# Patient Record
Sex: FEMALE | Race: WHITE | NOT HISPANIC OR LATINO | Employment: FULL TIME | URBAN - METROPOLITAN AREA
[De-identification: names, ages, dates, MRNs, and addresses within clinical notes are randomized per-mention and may not be internally consistent; named-entity substitution may affect disease eponyms.]

---

## 2018-05-01 ENCOUNTER — HISTORICAL (OUTPATIENT)
Dept: LAB | Facility: HOSPITAL | Age: 48
End: 2018-05-01

## 2018-08-15 ENCOUNTER — HISTORICAL (OUTPATIENT)
Dept: LAB | Facility: HOSPITAL | Age: 48
End: 2018-08-15

## 2019-03-23 ENCOUNTER — HISTORICAL (OUTPATIENT)
Dept: LAB | Facility: HOSPITAL | Age: 49
End: 2019-03-23

## 2019-03-23 LAB
ABS NEUT (OLG): 1.93 X10(3)/MCL (ref 2.1–9.2)
BASOPHILS # BLD AUTO: 0 X10(3)/MCL (ref 0–0.2)
BASOPHILS NFR BLD AUTO: 0 %
EOSINOPHIL # BLD AUTO: 0.1 X10(3)/MCL (ref 0–0.9)
EOSINOPHIL NFR BLD AUTO: 3 %
ERYTHROCYTE [DISTWIDTH] IN BLOOD BY AUTOMATED COUNT: 14.2 % (ref 11.5–17)
HCT VFR BLD AUTO: 42.2 % (ref 37–47)
HGB BLD-MCNC: 13.9 GM/DL (ref 12–16)
IRON SATN MFR SERPL: 17.2 % (ref 20–50)
IRON SERPL-MCNC: 64 MCG/DL (ref 50–175)
LYMPHOCYTES # BLD AUTO: 1.5 X10(3)/MCL (ref 0.6–4.6)
LYMPHOCYTES NFR BLD AUTO: 40 %
MCH RBC QN AUTO: 27.2 PG (ref 27–31)
MCHC RBC AUTO-ENTMCNC: 32.9 GM/DL (ref 33–36)
MCV RBC AUTO: 82.6 FL (ref 80–94)
MONOCYTES # BLD AUTO: 0.2 X10(3)/MCL (ref 0.1–1.3)
MONOCYTES NFR BLD AUTO: 6 %
NEUTROPHILS # BLD AUTO: 1.93 X10(3)/MCL (ref 1.4–7.9)
NEUTROPHILS NFR BLD AUTO: 52 %
PLATELET # BLD AUTO: 251 X10(3)/MCL (ref 130–400)
PMV BLD AUTO: 9.7 FL (ref 9.4–12.4)
RBC # BLD AUTO: 5.11 X10(6)/MCL (ref 4.2–5.4)
TIBC SERPL-MCNC: 372 MCG/DL (ref 250–450)
TRANSFERRIN SERPL-MCNC: 235 MG/DL (ref 200–360)
WBC # SPEC AUTO: 3.8 X10(3)/MCL (ref 4.5–11.5)

## 2019-06-22 ENCOUNTER — HISTORICAL (OUTPATIENT)
Dept: LAB | Facility: HOSPITAL | Age: 49
End: 2019-06-22

## 2020-08-29 ENCOUNTER — HISTORICAL (OUTPATIENT)
Dept: LAB | Facility: HOSPITAL | Age: 50
End: 2020-08-29

## 2021-12-11 ENCOUNTER — HISTORICAL (OUTPATIENT)
Dept: LAB | Facility: HOSPITAL | Age: 51
End: 2021-12-11

## 2022-10-04 DIAGNOSIS — D47.2 GAMMOPATHY, MONOCLONAL: Primary | ICD-10-CM

## 2022-10-10 PROBLEM — D72.819 LEUKOPENIA: Status: ACTIVE | Noted: 2022-10-10

## 2022-10-10 PROBLEM — D47.2 MONOCLONAL GAMMOPATHY: Status: ACTIVE | Noted: 2022-10-10

## 2022-10-12 ENCOUNTER — OFFICE VISIT (OUTPATIENT)
Dept: HEMATOLOGY/ONCOLOGY | Facility: CLINIC | Age: 52
End: 2022-10-12
Payer: COMMERCIAL

## 2022-10-12 VITALS
TEMPERATURE: 98 F | WEIGHT: 133 LBS | DIASTOLIC BLOOD PRESSURE: 90 MMHG | HEART RATE: 82 BPM | BODY MASS INDEX: 25.11 KG/M2 | HEIGHT: 61 IN | OXYGEN SATURATION: 100 % | SYSTOLIC BLOOD PRESSURE: 147 MMHG

## 2022-10-12 DIAGNOSIS — D47.2 GAMMOPATHY, MONOCLONAL: Primary | ICD-10-CM

## 2022-10-12 DIAGNOSIS — R76.8 ELEVATED RHEUMATOID FACTOR: ICD-10-CM

## 2022-10-12 DIAGNOSIS — D72.819 LEUKOPENIA, UNSPECIFIED TYPE: ICD-10-CM

## 2022-10-12 LAB
ALBUMIN SERPL-MCNC: 4.3 GM/DL (ref 3.5–5)
ALBUMIN/GLOB SERPL: 1.3 RATIO (ref 1.1–2)
ALP SERPL-CCNC: 80 UNIT/L (ref 40–150)
ALT SERPL-CCNC: 14 UNIT/L (ref 0–55)
AST SERPL-CCNC: 19 UNIT/L (ref 5–34)
BASOPHILS # BLD AUTO: 0.02 X10(3)/MCL (ref 0–0.2)
BASOPHILS NFR BLD AUTO: 0.4 %
BILIRUBIN DIRECT+TOT PNL SERPL-MCNC: 0.4 MG/DL
BUN SERPL-MCNC: 11.6 MG/DL (ref 9.8–20.1)
CALCIUM SERPL-MCNC: 9.9 MG/DL (ref 8.4–10.2)
CHLORIDE SERPL-SCNC: 102 MMOL/L (ref 98–107)
CO2 SERPL-SCNC: 32 MMOL/L (ref 22–29)
CREAT SERPL-MCNC: 0.83 MG/DL (ref 0.55–1.02)
EOSINOPHIL # BLD AUTO: 0.25 X10(3)/MCL (ref 0–0.9)
EOSINOPHIL NFR BLD AUTO: 5.1 %
ERYTHROCYTE [DISTWIDTH] IN BLOOD BY AUTOMATED COUNT: 12.2 % (ref 11.5–17)
GFR SERPLBLD CREATININE-BSD FMLA CKD-EPI: >60 MLS/MIN/1.73/M2
GLOBULIN SER-MCNC: 3.2 GM/DL (ref 2.4–3.5)
GLUCOSE SERPL-MCNC: 97 MG/DL (ref 74–100)
HCT VFR BLD AUTO: 43.6 % (ref 37–47)
HGB BLD-MCNC: 13.4 GM/DL (ref 12–16)
IMM GRANULOCYTES # BLD AUTO: 0 X10(3)/MCL (ref 0–0.04)
IMM GRANULOCYTES NFR BLD AUTO: 0 %
LYMPHOCYTES # BLD AUTO: 1.49 X10(3)/MCL (ref 0.6–4.6)
LYMPHOCYTES NFR BLD AUTO: 30.1 %
MCH RBC QN AUTO: 27.7 PG (ref 27–31)
MCHC RBC AUTO-ENTMCNC: 30.7 MG/DL (ref 33–36)
MCV RBC AUTO: 90.1 FL (ref 80–94)
MONOCYTES # BLD AUTO: 0.3 X10(3)/MCL (ref 0.1–1.3)
MONOCYTES NFR BLD AUTO: 6.1 %
NEUTROPHILS # BLD AUTO: 2.9 X10(3)/MCL (ref 2.1–9.2)
NEUTROPHILS NFR BLD AUTO: 58.3 %
PLATELET # BLD AUTO: 257 X10(3)/MCL (ref 130–400)
PMV BLD AUTO: 8.8 FL (ref 7.4–10.4)
POTASSIUM SERPL-SCNC: 4.2 MMOL/L (ref 3.5–5.1)
PROT SERPL-MCNC: 7.5 GM/DL (ref 6.4–8.3)
RBC # BLD AUTO: 4.84 X10(6)/MCL (ref 4.2–5.4)
SODIUM SERPL-SCNC: 140 MMOL/L (ref 136–145)
WBC # SPEC AUTO: 5 X10(3)/MCL (ref 4.5–11.5)

## 2022-10-12 PROCEDURE — 1160F PR REVIEW ALL MEDS BY PRESCRIBER/CLIN PHARMACIST DOCUMENTED: ICD-10-PCS | Mod: CPTII,S$GLB,, | Performed by: INTERNAL MEDICINE

## 2022-10-12 PROCEDURE — 82784 ASSAY IGA/IGD/IGG/IGM EACH: CPT | Performed by: INTERNAL MEDICINE

## 2022-10-12 PROCEDURE — 99204 PR OFFICE/OUTPT VISIT, NEW, LEVL IV, 45-59 MIN: ICD-10-PCS | Mod: S$GLB,,, | Performed by: INTERNAL MEDICINE

## 2022-10-12 PROCEDURE — 3080F PR MOST RECENT DIASTOLIC BLOOD PRESSURE >= 90 MM HG: ICD-10-PCS | Mod: CPTII,S$GLB,, | Performed by: INTERNAL MEDICINE

## 2022-10-12 PROCEDURE — 99204 OFFICE O/P NEW MOD 45 MIN: CPT | Mod: S$GLB,,, | Performed by: INTERNAL MEDICINE

## 2022-10-12 PROCEDURE — 99999 PR PBB SHADOW E&M-EST. PATIENT-LVL IV: CPT | Mod: PBBFAC,,, | Performed by: INTERNAL MEDICINE

## 2022-10-12 PROCEDURE — 3008F BODY MASS INDEX DOCD: CPT | Mod: CPTII,S$GLB,, | Performed by: INTERNAL MEDICINE

## 2022-10-12 PROCEDURE — 84165 PROTEIN E-PHORESIS SERUM: CPT | Performed by: INTERNAL MEDICINE

## 2022-10-12 PROCEDURE — 99999 PR PBB SHADOW E&M-EST. PATIENT-LVL IV: ICD-10-PCS | Mod: PBBFAC,,, | Performed by: INTERNAL MEDICINE

## 2022-10-12 PROCEDURE — 80053 COMPREHEN METABOLIC PANEL: CPT | Performed by: INTERNAL MEDICINE

## 2022-10-12 PROCEDURE — 3008F PR BODY MASS INDEX (BMI) DOCUMENTED: ICD-10-PCS | Mod: CPTII,S$GLB,, | Performed by: INTERNAL MEDICINE

## 2022-10-12 PROCEDURE — 3077F SYST BP >= 140 MM HG: CPT | Mod: CPTII,S$GLB,, | Performed by: INTERNAL MEDICINE

## 2022-10-12 PROCEDURE — 1159F MED LIST DOCD IN RCRD: CPT | Mod: CPTII,S$GLB,, | Performed by: INTERNAL MEDICINE

## 2022-10-12 PROCEDURE — 36415 COLL VENOUS BLD VENIPUNCTURE: CPT | Performed by: INTERNAL MEDICINE

## 2022-10-12 PROCEDURE — 85025 COMPLETE CBC W/AUTO DIFF WBC: CPT | Performed by: INTERNAL MEDICINE

## 2022-10-12 PROCEDURE — 1160F RVW MEDS BY RX/DR IN RCRD: CPT | Mod: CPTII,S$GLB,, | Performed by: INTERNAL MEDICINE

## 2022-10-12 PROCEDURE — 3077F PR MOST RECENT SYSTOLIC BLOOD PRESSURE >= 140 MM HG: ICD-10-PCS | Mod: CPTII,S$GLB,, | Performed by: INTERNAL MEDICINE

## 2022-10-12 PROCEDURE — 83521 IG LIGHT CHAINS FREE EACH: CPT | Performed by: INTERNAL MEDICINE

## 2022-10-12 PROCEDURE — 1159F PR MEDICATION LIST DOCUMENTED IN MEDICAL RECORD: ICD-10-PCS | Mod: CPTII,S$GLB,, | Performed by: INTERNAL MEDICINE

## 2022-10-12 PROCEDURE — 3080F DIAST BP >= 90 MM HG: CPT | Mod: CPTII,S$GLB,, | Performed by: INTERNAL MEDICINE

## 2022-10-12 RX ORDER — DICLOFENAC SODIUM 50 MG/1
TABLET, DELAYED RELEASE ORAL
COMMUNITY
Start: 2022-09-20 | End: 2023-05-03

## 2022-10-12 RX ORDER — FLUTICASONE PROPIONATE 50 MCG
SPRAY, SUSPENSION (ML) NASAL
COMMUNITY
Start: 2022-10-10

## 2022-10-12 RX ORDER — ERENUMAB-AOOE 70 MG/ML
INJECTION SUBCUTANEOUS
COMMUNITY
Start: 2022-09-22 | End: 2023-11-10

## 2022-10-12 RX ORDER — RIZATRIPTAN BENZOATE 10 MG/1
TABLET ORAL
COMMUNITY
Start: 2022-09-22

## 2022-10-12 RX ORDER — SULFASALAZINE 500 MG/1
TABLET ORAL
COMMUNITY
Start: 2022-09-27 | End: 2023-11-10

## 2022-10-12 RX ORDER — LEVOTHYROXINE SODIUM 50 UG/1
50 TABLET ORAL DAILY
COMMUNITY
Start: 2022-10-10

## 2022-10-12 RX ORDER — AMITRIPTYLINE HYDROCHLORIDE 25 MG/1
TABLET, FILM COATED ORAL NIGHTLY PRN
COMMUNITY
Start: 2022-09-26

## 2022-10-12 NOTE — PROGRESS NOTES
HEMATOLOGY/ONCOLOGY OFFICE CLINIC VISIT    Visit Information:    Initial Evaluation:   Referring Provider:   Other providers:  Code status:    Diagnosis:  MGUS  Leukopenia    Present treatment:    Treatment/Oncology history:    Plan of care: MM w/u    Imaging:    Pathology:      CLINICAL HISTORY:       Patient: Adrien Bertrand is a 51 y.o. female kindly referred for monoclonal gammopathy.   Patient was being evaluated for rheumatoid arthritis when her rheumatoid factor was elevated.  Quantitative immunoglobulins were performed they will normal.  Serum protein electrophoresis showed an M spike of 0.3.  Blood also showed a white blood cell counts of 2.8.    Reviewing EMR Leukopenia-chronic and at least 2018 ranging from 3.8-4.4. On 2015 she had  WBC 4.4  Peripheral smear in 2016: Normocytic hypochromic red blood cell population with no significant anisocytosis.  Unremarkable granulocytic maturation, numerous monocytes, mature lymphocytes, rare eosinophils. No circulating blast forms.  Unremarkable platelet morphology.  She has been with mild iron deficiency off and on as well.      Patient does report fatigue and tiredness and of an of migraine headache.  Otherwise she is doing well and denies any fever, chills, sweats.  No chest pain or short of breath.  No changes in bowel habits.  She is  and have teenagers, triplets at home.    Chief Complaint: NP (Referral from Dr Chilango Mancia for Monoclonal Gammopathy)      Interval History:        Past Medical History:   Diagnosis Date    Hypothyroidism, unspecified     Migraine       Past Surgical History:   Procedure Laterality Date     SECTION      right thyroid lobectomy and isthmusectomy      SINUS SURGERY      TUBAL LIGATION       History reviewed. No pertinent family history.  Social Connections: Not on file       Review of patient's allergies indicates:  No Known Allergies   Current Outpatient Medications on File Prior to Visit   Medication Sig  Dispense Refill    AIMOVIG AUTOINJECTOR 70 mg/mL autoinjector Once a month      amitriptyline (ELAVIL) 25 MG tablet nightly as needed.      fluticasone propionate (FLONASE) 50 mcg/actuation nasal spray by Each Nostril route.      rizatriptan (MAXALT) 10 MG tablet prn      sulfaSALAzine (AZULFIDINE) 500 mg Tab bid      SYNTHROID 50 mcg tablet Take 50 mcg by mouth once daily.      diclofenac (VOLTAREN) 50 MG EC tablet        No current facility-administered medications on file prior to visit.      Review of Systems   Constitutional:  Positive for fatigue. Negative for activity change, appetite change, chills, fever and unexpected weight change.   HENT:  Negative for mouth dryness, mouth sores, nosebleeds, sore throat and trouble swallowing.    Eyes:  Negative for visual disturbance.   Respiratory:  Negative for cough and shortness of breath.    Cardiovascular:  Negative for chest pain, palpitations and leg swelling.   Gastrointestinal:  Negative for abdominal distention, abdominal pain, blood in stool, change in bowel habit, constipation, diarrhea, nausea, vomiting and change in bowel habit.   Endocrine: Negative.    Genitourinary: Negative.  Negative for dysuria, frequency, hematuria and urgency.   Musculoskeletal:  Positive for arthralgias. Negative for back pain, myalgias and neck pain.   Integumentary:  Negative for rash, breast mass, breast discharge and breast tenderness.   Neurological:  Negative for dizziness, tremors, syncope, speech difficulty, weakness, light-headedness, numbness, headaches and memory loss.   Hematological:  Negative for adenopathy. Does not bruise/bleed easily.   Psychiatric/Behavioral:  Negative for confusion and suicidal ideas. The patient is not nervous/anxious.    Breast: Negative for mass and tenderness           Vitals:    10/12/22 1316   BP: (!) 147/90   BP Location: Left arm   Patient Position: Sitting   Pulse: 82   Temp: 98.1 °F (36.7 °C)   TempSrc: Oral   SpO2: 100%   Weight:  "60.3 kg (133 lb)   Height: 5' 1" (1.549 m)      Physical Exam  Vitals and nursing note reviewed.   Constitutional:       General: She is not in acute distress.     Appearance: Normal appearance. She is well-developed.   HENT:      Head: Normocephalic and atraumatic.      Mouth/Throat:      Mouth: Mucous membranes are moist.   Eyes:      General: No scleral icterus.     Extraocular Movements: Extraocular movements intact.      Conjunctiva/sclera: Conjunctivae normal.      Pupils: Pupils are equal, round, and reactive to light.   Neck:      Vascular: No JVD.   Cardiovascular:      Rate and Rhythm: Normal rate and regular rhythm.      Heart sounds: No murmur heard.  Pulmonary:      Effort: Pulmonary effort is normal.      Breath sounds: Normal breath sounds. No wheezing or rhonchi.   Abdominal:      General: Bowel sounds are normal. There is no distension.      Palpations: Abdomen is soft. There is no mass.      Tenderness: There is no abdominal tenderness.   Musculoskeletal:         General: No swelling or deformity.      Cervical back: Neck supple.   Lymphadenopathy:      Head:      Right side of head: No submandibular adenopathy.      Left side of head: No submandibular adenopathy.      Cervical: No cervical adenopathy.      Upper Body:      Right upper body: No supraclavicular or axillary adenopathy.      Left upper body: No supraclavicular or axillary adenopathy.      Lower Body: No right inguinal adenopathy. No left inguinal adenopathy.   Skin:     General: Skin is warm.      Coloration: Skin is not jaundiced.      Findings: No lesion or rash.      Nails: There is no clubbing.   Neurological:      General: No focal deficit present.      Mental Status: She is alert and oriented to person, place, and time.      Cranial Nerves: Cranial nerves 2-12 are intact.   Psychiatric:         Attention and Perception: Attention normal.         Behavior: Behavior is cooperative.         Judgment: Judgment normal.     ECOG SCORE "    0 - Fully active-able to carry on all pre-disease performance without restriction         Laboratory:  CBC with Differential:  Lab Results   Component Value Date    WBC 5.0 10/12/2022    RBC 4.84 10/12/2022    HGB 13.4 10/12/2022    HCT 43.6 10/12/2022    MCV 90.1 10/12/2022    MCH 27.7 10/12/2022    MCHC 30.7 (L) 10/12/2022    RDW 12.2 10/12/2022     10/12/2022    MPV 8.8 10/12/2022        CMP:  No results found for: NA, K, CL, CO2, GLU, BUN, CREATININE, CALCIUM, PROT, ALBUMIN, BILITOT, ALKPHOS, AST, ALT, ANIONGAP, ESTGFRAFRICA, EGFRNONAA          Assessment:       1. Gammopathy, monoclonal    2. Leukopenia, unspecified type    3. Elevated rheumatoid factor        MGUS  Leukopenia-chronic and at least 2018 ranging from 3.8-4.4. On 2015 she had  WBC 4.4           Plan:     Discussed with the patient that Multiple myeloma is a type of bone marrow cancer that forms in a type of white blood cell called a plasma cell. Plasma cells help you fight infections by making antibodies that recognize and attack germs but when they transformed into cancer cells then they are not efficient and don't work well. This cells can affect not only the bone marrow but the bones in general causing distinctive bone lesions called Lytic lesions. It also can affect the kidney. Discussed prognosis and treatment recommendations and indications.  Discussed Monoclonal gammopathy of undetermined significance (MGUS) and that is the most common of a spectrum of diseases called plasma cell dyscrasias. The term MGUS denotes the presence of a monoclonal immunoglobulin (Ig), or M-protein, in the serum or urine in persons without evidence of multiple myeloma (MM), Waldenström macroglobulinemia (WM), amyloidosis (AL) or other lymphoproliferative disorders. The risk of progression to MM or other lymphoproliferative disorder is present at a constant rate throughout the remainder of a patient's life. This risk has been quantified at 1% per year.  I  also discussed with the patient that elevated globulin can be associated with autoimmune disorders or recent infections/inflammation.  This is a disorder of the immune system so anything that affect immune system can increase the globulin level.     Basic MM workup today  24 hrs urine collection  Skeletal survey  Virtual visit in 2 weeks for results  Will defer BM biopsy for now as she is not anemic    Encourage to call or message us for any questions or problems  The patient was given ample opportunity to ask questions, and to the best of my abilities, all questions answered to satisfaction; patient demonstrated understanding of what we discussed and agreeable to the plan.     I'd like to thank for referring and allowing me the opportunity to participate in the care of this patient and if any questions, please do not hesitate to call the office at [phone].       ELIU CHEUNG MD

## 2022-10-13 LAB
ALBUMIN % SPEP (OHS): 54.15 (ref 48.1–59.5)
ALBUMIN SERPL-MCNC: 4 GM/DL (ref 3.5–5)
ALBUMIN/GLOB SERPL: 1.2 RATIO (ref 1.1–2)
ALPHA 1 GLOB (OHS): 0.27 GM/DL (ref 0–0.4)
ALPHA 1 GLOB% (OHS): 3.65 (ref 2.3–4.9)
ALPHA 2 GLOB % (OHS): 10.69 (ref 6.9–13)
ALPHA 2 GLOB (OHS): 0.78 GM/DL (ref 0.4–1)
BETA GLOB (OHS): 1.02 GM/DL (ref 0.7–1.3)
BETA GLOB% (OHS): 13.94 (ref 13.8–19.7)
GAMMA GLOBULIN % (OHS): 17.57 (ref 10.1–21.9)
GAMMA GLOBULIN (OHS): 1.28 GM/DL (ref 0.4–1.8)
GLOBULIN SER-MCNC: 3.3 GM/DL (ref 2.4–3.5)
IGA SERPL-MCNC: 199 MG/DL (ref 65–421)
IGG SERPL-MCNC: 1195 MG/DL (ref 522–1631)
IGM SERPL-MCNC: 78 MG/DL (ref 33–293)
M SPIKE % (OHS): 1.16
M SPIKE (OHS): 0.08 GM/DL
PATH REV: NORMAL
PROT SERPL-MCNC: 7.3 GM/DL (ref 6.4–8.3)

## 2022-10-14 LAB
KAPPA LC FREE SER-MCNC: 1.48 MG/DL (ref 0.33–1.94)
KAPPA LC FREE/LAMBDA FREE SER: 1.07 {RATIO} (ref 0.26–1.65)
LAMBDA LC FREE SERPL-MCNC: 1.38 MG/DL (ref 0.57–2.63)

## 2022-10-17 DIAGNOSIS — D47.2 GAMMOPATHY, MONOCLONAL: Primary | ICD-10-CM

## 2022-10-17 DIAGNOSIS — D72.819 LEUKOPENIA, UNSPECIFIED TYPE: ICD-10-CM

## 2022-10-17 PROCEDURE — 30000890 MAYO GENERIC ORDERABLE: Performed by: INTERNAL MEDICINE

## 2022-10-17 PROCEDURE — 36415 COLL VENOUS BLD VENIPUNCTURE: CPT | Performed by: INTERNAL MEDICINE

## 2022-10-17 PROCEDURE — 84166 PROTEIN E-PHORESIS/URINE/CSF: CPT | Performed by: INTERNAL MEDICINE

## 2022-10-24 LAB — MAYO GENERIC ORDERABLE RESULT: ABNORMAL

## 2022-10-28 ENCOUNTER — HOSPITAL ENCOUNTER (OUTPATIENT)
Dept: RADIOLOGY | Facility: HOSPITAL | Age: 52
Discharge: HOME | End: 2022-10-28
Attending: INTERNAL MEDICINE
Payer: COMMERCIAL

## 2022-10-28 DIAGNOSIS — D47.2 GAMMOPATHY, MONOCLONAL: ICD-10-CM

## 2022-10-28 PROCEDURE — 77074 RADEX OSSEOUS SURVEY LMTD: CPT | Mod: TC

## 2022-11-01 ENCOUNTER — OFFICE VISIT (OUTPATIENT)
Dept: HEMATOLOGY/ONCOLOGY | Facility: CLINIC | Age: 52
End: 2022-11-01
Payer: COMMERCIAL

## 2022-11-01 DIAGNOSIS — R76.8 ELEVATED RHEUMATOID FACTOR: ICD-10-CM

## 2022-11-01 DIAGNOSIS — D72.819 LEUKOPENIA, UNSPECIFIED TYPE: ICD-10-CM

## 2022-11-01 DIAGNOSIS — D47.2 MONOCLONAL GAMMOPATHY: Primary | ICD-10-CM

## 2022-11-01 PROCEDURE — 1160F PR REVIEW ALL MEDS BY PRESCRIBER/CLIN PHARMACIST DOCUMENTED: ICD-10-PCS | Mod: CPTII,95,, | Performed by: INTERNAL MEDICINE

## 2022-11-01 PROCEDURE — 99213 PR OFFICE/OUTPT VISIT, EST, LEVL III, 20-29 MIN: ICD-10-PCS | Mod: 95,,, | Performed by: INTERNAL MEDICINE

## 2022-11-01 PROCEDURE — 1159F MED LIST DOCD IN RCRD: CPT | Mod: CPTII,95,, | Performed by: INTERNAL MEDICINE

## 2022-11-01 PROCEDURE — 1159F PR MEDICATION LIST DOCUMENTED IN MEDICAL RECORD: ICD-10-PCS | Mod: CPTII,95,, | Performed by: INTERNAL MEDICINE

## 2022-11-01 PROCEDURE — 1160F RVW MEDS BY RX/DR IN RCRD: CPT | Mod: CPTII,95,, | Performed by: INTERNAL MEDICINE

## 2022-11-01 PROCEDURE — 99213 OFFICE O/P EST LOW 20 MIN: CPT | Mod: 95,,, | Performed by: INTERNAL MEDICINE

## 2022-11-01 NOTE — PROGRESS NOTES
HEMATOLOGY/ONCOLOGY OFFICE CLINIC VISIT      This is a telemedicine note.  Patient was treated using telemedicine, real-time audio and video, according to [facility] protocols.      I, Dr. Yeni Valerio, distant provided, conducted the visit from location identified below.  The patient participated in the visit at a non-[facility] location selected by the patient (or patient's representative), identified below.  I am licensed in the [facility] where the patient stated they are located.  The patient, (or patient's representative) stated that they understood and accepted privacy and security risk to the information and her location.   I have reviewed the patient name and date of birth  I have verbally reviewed the past medical history, active medication list, and allergies with the patient (parent/ legal guardian for a patient under the age of 18 years old) and verified with picture ID if applicable . I have verified that this patient is a resident in the [facility].  I have verbally obtained review of systems    Patient was located in the [facility]  I, Dr. Yeni Valerio, distant provider, was located at [facility] .    Face-to-face time spent with the patient exceeds 25 minutes, over 50% of which was used for education and counseling regarding medical conditions, current medications including risk/benefits and side effects/adverse events, over-the-counter medications uses/doses, home self-care and contact precautions, red flags and indication for immediate medical attention.  The patient is receptive, expresses understanding and is agreeable to the plan.  All questions answered.        Visit Information:    Initial Evaluation: 10/12/2022  Referring Provider: Dr Mancia  Other providers:  Code status:     Diagnosis:  MGUS  Leukopenia     Present treatment:     Treatment/Oncology history:     Plan of care: MM w/u      Imaging:      Pathology:    CLINICAL HISTORY:       Patient: Adrien Bertrand is a 51  y.o. female  kindly referred for monoclonal gammopathy.   Patient was being evaluated for rheumatoid arthritis when her rheumatoid factor was elevated.  Quantitative immunoglobulins were performed they will normal.  Serum protein electrophoresis showed an M spike of 0.3.  Blood also showed a white blood cell counts of 2.8.     Reviewing EMR Leukopenia-chronic and at least 2018 ranging from 3.8-4.4. On 2015 she had  WBC 4.4  Peripheral smear in 2016: Normocytic hypochromic red blood cell population with no significant anisocytosis.  Unremarkable granulocytic maturation, numerous monocytes, mature lymphocytes, rare eosinophils. No circulating blast forms.  Unremarkable platelet morphology.  She has been with mild iron deficiency off and on as well.       Patient does report fatigue and tiredness and of an of migraine headache.  Otherwise she is doing well and denies any fever, chills, sweats.  No chest pain or short of breath.  No changes in bowel habits.  She is  and have teenagers, triplets at home.      Chief Complaint: No chief complaint on file.      Interval History:  Virtual visit to discuss results.  Patient is doing well and voices no complaints.  She denies fever, chills, sweats.  No chest pain or short of breath.  No changes in bowel habits.  Discuss blood work in details with her.  Monoclonal gammopathy with an M spike of 0.08.  Urine and skeletal survey were negative.    ROS:  All 14 points ROS taken and as per Interval History    Histories:  PMH/PSH/FH/SOCIAL/ALLERGIES AND MEDS REVIEWED AND UPDATED AS APPROPRIATE       There were no vitals filed for this visit.   Physical Exam  Constitutional:       Appearance: Normal appearance.   HENT:      Head: Normocephalic.   Eyes:      Extraocular Movements: Extraocular movements intact.   Pulmonary:      Effort: Pulmonary effort is normal.   Musculoskeletal:      Cervical back: Neck supple.   Neurological:      Mental Status: She is alert.    Psychiatric:         Mood and Affect: Mood normal.         Behavior: Behavior normal.         Thought Content: Thought content normal.         Judgment: Judgment normal.     ECOG SCORE             Laboratory:  CBC with Differential:  Lab Results   Component Value Date    WBC 5.0 10/12/2022    RBC 4.84 10/12/2022    HGB 13.4 10/12/2022    HCT 43.6 10/12/2022    MCV 90.1 10/12/2022    MCH 27.7 10/12/2022    MCHC 30.7 (L) 10/12/2022    RDW 12.2 10/12/2022     10/12/2022    MPV 8.8 10/12/2022      Monoclonal IgG Kappa is present in the gamma region.   The m-spike = 0.08 gm/dl.     CMP:  Sodium Level   Date Value Ref Range Status   10/12/2022 140 136 - 145 mmol/L Final     Potassium Level   Date Value Ref Range Status   10/12/2022 4.2 3.5 - 5.1 mmol/L Final     Carbon Dioxide   Date Value Ref Range Status   10/12/2022 32 (H) 22 - 29 mmol/L Final     Blood Urea Nitrogen   Date Value Ref Range Status   10/12/2022 11.6 9.8 - 20.1 mg/dL Final     Creatinine   Date Value Ref Range Status   10/12/2022 0.83 0.55 - 1.02 mg/dL Final     Calcium Level Total   Date Value Ref Range Status   10/12/2022 9.9 8.4 - 10.2 mg/dL Final     Albumin Level   Date Value Ref Range Status   10/12/2022 4.3 3.5 - 5.0 gm/dL Final   10/12/2022 4.0 3.5 - 5.0 gm/dL Final     Bilirubin Total   Date Value Ref Range Status   10/12/2022 0.4 <=1.5 mg/dL Final     Alkaline Phosphatase   Date Value Ref Range Status   10/12/2022 80 40 - 150 unit/L Final     Aspartate Aminotransferase   Date Value Ref Range Status   10/12/2022 19 5 - 34 unit/L Final     Alanine Aminotransferase   Date Value Ref Range Status   10/12/2022 14 0 - 55 unit/L Final             Assessment:       1. Monoclonal gammopathy    2. Leukopenia, unspecified type    3. Elevated rheumatoid factor       MGUS  Leukopenia-chronic and at least 2018 ranging from 3.8-4.4. On 2015 she had  WBC 4.4      Plan:       The risk of progression to MM or other lymphoproliferative disorder is present  at a constant rate throughout the remainder of a patient's life. This risk has been quantified at 1% per year. I also discussed with the patient that elevated globulin can be associated with autoimmune disorders or recent infections/inflammation.  This is a disorder of the immune system so anything that affect immune system can increase the globulin level.   Discuss blood work in details with her.  Monoclonal gammopathy with an M spike of 0.08.  Urine and skeletal survey were negative.  She is not anemic, calcium and kidney function normal.  Will start surveillance every 6 months.    RTC 6 months  MM labs 1 week prior  NO 24 hrs urine collection  Will defer BM biopsy for now as she is not anemic     Encourage to call or message us for any questions or problems  The patient was given ample opportunity to ask questions, and to the best of my abilities, all questions answered to satisfaction; patient demonstrated understanding of what we discussed and agreeable to the plan.     ELIU CHEUNG MD          Answers submitted by the patient for this visit:  Review of Systems Questionnaire (Submitted on 10/27/2022)  appetite change : No  unexpected weight change: No  mouth sores: No  visual disturbance: No  cough: No  shortness of breath: No  chest pain: No  abdominal pain: No  diarrhea: No  frequency: No  back pain: No  rash: No  headaches: No  adenopathy: No  nervous/ anxious: No

## 2023-02-18 ENCOUNTER — LAB VISIT (OUTPATIENT)
Dept: LAB | Facility: HOSPITAL | Age: 53
End: 2023-02-18
Attending: FAMILY MEDICINE
Payer: COMMERCIAL

## 2023-02-18 DIAGNOSIS — E89.0 POSTSURGICAL HYPOTHYROIDISM: ICD-10-CM

## 2023-02-18 DIAGNOSIS — E78.5 HYPERLIPIDEMIA, UNSPECIFIED HYPERLIPIDEMIA TYPE: ICD-10-CM

## 2023-02-18 DIAGNOSIS — Z00.00 ROUTINE GENERAL MEDICAL EXAMINATION AT A HEALTH CARE FACILITY: Primary | ICD-10-CM

## 2023-02-18 LAB
ALBUMIN SERPL-MCNC: 4.3 G/DL (ref 3.5–5)
ALP SERPL-CCNC: 73 UNIT/L (ref 40–150)
ALT SERPL-CCNC: 16 UNIT/L (ref 0–55)
ANION GAP SERPL CALC-SCNC: 9 MEQ/L
AST SERPL-CCNC: 19 UNIT/L (ref 5–34)
BILIRUBIN DIRECT+TOT PNL SERPL-MCNC: 0.2 MG/DL (ref 0–0.8)
BILIRUBIN DIRECT+TOT PNL SERPL-MCNC: 0.2 MG/DL (ref 0–?)
BILIRUBIN DIRECT+TOT PNL SERPL-MCNC: 0.4 MG/DL
BUN SERPL-MCNC: 16.2 MG/DL (ref 9.8–20.1)
CALCIUM SERPL-MCNC: 9.8 MG/DL (ref 8.4–10.2)
CHLORIDE SERPL-SCNC: 105 MMOL/L (ref 98–107)
CHOLEST SERPL-MCNC: 240 MG/DL
CHOLEST/HDLC SERPL: 3 {RATIO} (ref 0–5)
CO2 SERPL-SCNC: 28 MMOL/L (ref 22–29)
CREAT SERPL-MCNC: 0.83 MG/DL (ref 0.55–1.02)
CREAT/UREA NIT SERPL: 20
ERYTHROCYTE [DISTWIDTH] IN BLOOD BY AUTOMATED COUNT: 12.4 % (ref 11.5–17)
FT4I SERPL CALC-MCNC: 3.71 (ref 2.6–3.6)
GFR SERPLBLD CREATININE-BSD FMLA CKD-EPI: >60 MLS/MIN/1.73/M2
GLUCOSE SERPL-MCNC: 83 MG/DL (ref 74–100)
HCT VFR BLD AUTO: 43.6 % (ref 37–47)
HDLC SERPL-MCNC: 88 MG/DL (ref 35–60)
HGB BLD-MCNC: 13.6 G/DL (ref 12–16)
LDLC SERPL CALC-MCNC: 141 MG/DL (ref 50–140)
MCH RBC QN AUTO: 27.6 PG
MCHC RBC AUTO-ENTMCNC: 31.2 G/DL (ref 33–36)
MCV RBC AUTO: 88.4 FL (ref 80–94)
PLATELET # BLD AUTO: 228 X10(3)/MCL (ref 130–400)
PMV BLD AUTO: 9.3 FL (ref 7.4–10.4)
POTASSIUM SERPL-SCNC: 4.5 MMOL/L (ref 3.5–5.1)
PROT SERPL-MCNC: 7.4 GM/DL (ref 6.4–8.3)
RBC # BLD AUTO: 4.93 X10(6)/MCL (ref 4.2–5.4)
SODIUM SERPL-SCNC: 142 MMOL/L (ref 136–145)
T3RU NFR SERPL: 38.51 % (ref 31–39)
T4 SERPL-MCNC: 9.64 UG/DL (ref 4.87–11.72)
TRIGL SERPL-MCNC: 53 MG/DL (ref 37–140)
TSH SERPL-ACNC: 0.38 UIU/ML (ref 0.35–4.94)
VLDLC SERPL CALC-MCNC: 11 MG/DL
WBC # SPEC AUTO: 3.2 X10(3)/MCL (ref 4.5–11.5)

## 2023-02-18 PROCEDURE — 36415 COLL VENOUS BLD VENIPUNCTURE: CPT

## 2023-02-18 PROCEDURE — 80048 BASIC METABOLIC PNL TOTAL CA: CPT

## 2023-02-18 PROCEDURE — 84479 ASSAY OF THYROID (T3 OR T4): CPT

## 2023-02-18 PROCEDURE — 84443 ASSAY THYROID STIM HORMONE: CPT

## 2023-02-18 PROCEDURE — 84436 ASSAY OF TOTAL THYROXINE: CPT

## 2023-02-18 PROCEDURE — 85027 COMPLETE CBC AUTOMATED: CPT

## 2023-02-18 PROCEDURE — 80076 HEPATIC FUNCTION PANEL: CPT

## 2023-02-18 PROCEDURE — 80061 LIPID PANEL: CPT

## 2023-04-25 ENCOUNTER — LAB VISIT (OUTPATIENT)
Dept: LAB | Facility: HOSPITAL | Age: 53
End: 2023-04-25
Attending: INTERNAL MEDICINE
Payer: COMMERCIAL

## 2023-04-25 DIAGNOSIS — D47.2 MONOCLONAL GAMMOPATHY: ICD-10-CM

## 2023-04-25 LAB
ALBUMIN SERPL-MCNC: 4 G/DL (ref 3.5–5)
ALBUMIN/GLOB SERPL: 1.4 RATIO (ref 1.1–2)
ALP SERPL-CCNC: 73 UNIT/L (ref 40–150)
ALT SERPL-CCNC: 13 UNIT/L (ref 0–55)
AST SERPL-CCNC: 16 UNIT/L (ref 5–34)
BASOPHILS # BLD AUTO: 0.01 X10(3)/MCL (ref 0–0.2)
BASOPHILS NFR BLD AUTO: 0.2 %
BILIRUBIN DIRECT+TOT PNL SERPL-MCNC: 0.2 MG/DL
BUN SERPL-MCNC: 16 MG/DL (ref 9.8–20.1)
CALCIUM SERPL-MCNC: 9.8 MG/DL (ref 8.4–10.2)
CHLORIDE SERPL-SCNC: 103 MMOL/L (ref 98–107)
CO2 SERPL-SCNC: 31 MMOL/L (ref 22–29)
CREAT SERPL-MCNC: 0.97 MG/DL (ref 0.55–1.02)
EOSINOPHIL # BLD AUTO: 0.12 X10(3)/MCL (ref 0–0.9)
EOSINOPHIL NFR BLD AUTO: 2.6 %
ERYTHROCYTE [DISTWIDTH] IN BLOOD BY AUTOMATED COUNT: 11.8 % (ref 11.5–17)
GFR SERPLBLD CREATININE-BSD FMLA CKD-EPI: >60 MLS/MIN/1.73/M2
GLOBULIN SER-MCNC: 2.9 GM/DL (ref 2.4–3.5)
GLUCOSE SERPL-MCNC: 81 MG/DL (ref 74–100)
HCT VFR BLD AUTO: 39.8 % (ref 37–47)
HGB BLD-MCNC: 12.5 G/DL (ref 12–16)
IGA SERPL-MCNC: 185 MG/DL (ref 65–421)
IGG SERPL-MCNC: 1092 MG/DL (ref 522–1631)
IGM SERPL-MCNC: 47 MG/DL (ref 33–293)
IMM GRANULOCYTES # BLD AUTO: 0 X10(3)/MCL (ref 0–0.04)
IMM GRANULOCYTES NFR BLD AUTO: 0 %
LYMPHOCYTES # BLD AUTO: 1.38 X10(3)/MCL (ref 0.6–4.6)
LYMPHOCYTES NFR BLD AUTO: 30.4 %
MCH RBC QN AUTO: 29.1 PG (ref 27–31)
MCHC RBC AUTO-ENTMCNC: 31.4 G/DL (ref 33–36)
MCV RBC AUTO: 92.6 FL (ref 80–94)
MONOCYTES # BLD AUTO: 0.3 X10(3)/MCL (ref 0.1–1.3)
MONOCYTES NFR BLD AUTO: 6.6 %
NEUTROPHILS # BLD AUTO: 2.73 X10(3)/MCL (ref 2.1–9.2)
NEUTROPHILS NFR BLD AUTO: 60.2 %
PLATELET # BLD AUTO: 220 X10(3)/MCL (ref 130–400)
PMV BLD AUTO: 8.9 FL (ref 7.4–10.4)
POTASSIUM SERPL-SCNC: 4.4 MMOL/L (ref 3.5–5.1)
PROT SERPL-MCNC: 6.9 GM/DL (ref 6.4–8.3)
RBC # BLD AUTO: 4.3 X10(6)/MCL (ref 4.2–5.4)
SODIUM SERPL-SCNC: 139 MMOL/L (ref 136–145)
WBC # SPEC AUTO: 4.5 X10(3)/MCL (ref 4.5–11.5)

## 2023-04-25 PROCEDURE — 82784 ASSAY IGA/IGD/IGG/IGM EACH: CPT

## 2023-04-25 PROCEDURE — 83521 IG LIGHT CHAINS FREE EACH: CPT | Mod: 90

## 2023-04-25 PROCEDURE — 86334 IMMUNOFIX E-PHORESIS SERUM: CPT

## 2023-04-25 PROCEDURE — 36415 COLL VENOUS BLD VENIPUNCTURE: CPT

## 2023-04-25 PROCEDURE — 84165 PROTEIN E-PHORESIS SERUM: CPT

## 2023-04-25 PROCEDURE — 85025 COMPLETE CBC W/AUTO DIFF WBC: CPT

## 2023-04-25 PROCEDURE — 80053 COMPREHEN METABOLIC PANEL: CPT

## 2023-04-26 LAB
ALBUMIN % SPEP (OHS): 54.1
ALBUMIN SERPL-MCNC: 3.7 G/DL (ref 3.5–5)
ALBUMIN/GLOB SERPL: 1.2 RATIO (ref 1.1–2)
ALPHA 1 GLOB (OHS): 0.24 GM/DL
ALPHA 1 GLOB% (OHS): 3.59
ALPHA 2 GLOB % (OHS): 10.93
ALPHA 2 GLOB (OHS): 0.74 GM/DL
BETA GLOB (OHS): 0.97 GM/DL
BETA GLOB% (OHS): 14.27
GAMMA GLOBULIN % (OHS): 17.12
GAMMA GLOBULIN (OHS): 1.16 GM/DL
GLOBULIN SER-MCNC: 3.1 GM/DL (ref 2.4–3.5)
M SPIKE % (OHS): 1.81
M SPIKE (OHS): 0.12 GM/DL
PATH REV: NORMAL
PROT SERPL-MCNC: 6.8 GM/DL (ref 6.4–8.3)

## 2023-04-27 LAB
KAPPA LC FREE SER-MCNC: 1.43 MG/DL (ref 0.33–1.94)
KAPPA LC FREE/LAMBDA FREE SER: 1.31 {RATIO} (ref 0.26–1.65)
LAMBDA LC FREE SERPL-MCNC: 1.09 MG/DL (ref 0.57–2.63)

## 2023-05-03 ENCOUNTER — OFFICE VISIT (OUTPATIENT)
Dept: HEMATOLOGY/ONCOLOGY | Facility: CLINIC | Age: 53
End: 2023-05-03
Payer: COMMERCIAL

## 2023-05-03 VITALS
TEMPERATURE: 98 F | WEIGHT: 126.63 LBS | DIASTOLIC BLOOD PRESSURE: 83 MMHG | HEIGHT: 69 IN | OXYGEN SATURATION: 100 % | HEART RATE: 84 BPM | BODY MASS INDEX: 18.76 KG/M2 | SYSTOLIC BLOOD PRESSURE: 123 MMHG

## 2023-05-03 DIAGNOSIS — D47.2 MONOCLONAL GAMMOPATHY: Primary | ICD-10-CM

## 2023-05-03 PROCEDURE — 99213 PR OFFICE/OUTPT VISIT, EST, LEVL III, 20-29 MIN: ICD-10-PCS | Mod: S$GLB,,, | Performed by: NURSE PRACTITIONER

## 2023-05-03 PROCEDURE — 99213 OFFICE O/P EST LOW 20 MIN: CPT | Mod: PBBFAC | Performed by: NURSE PRACTITIONER

## 2023-05-03 PROCEDURE — 1160F RVW MEDS BY RX/DR IN RCRD: CPT | Mod: CPTII,S$GLB,, | Performed by: NURSE PRACTITIONER

## 2023-05-03 PROCEDURE — 1159F PR MEDICATION LIST DOCUMENTED IN MEDICAL RECORD: ICD-10-PCS | Mod: CPTII,S$GLB,, | Performed by: NURSE PRACTITIONER

## 2023-05-03 PROCEDURE — 3079F PR MOST RECENT DIASTOLIC BLOOD PRESSURE 80-89 MM HG: ICD-10-PCS | Mod: CPTII,S$GLB,, | Performed by: NURSE PRACTITIONER

## 2023-05-03 PROCEDURE — 99999 PR PBB SHADOW E&M-EST. PATIENT-LVL III: CPT | Mod: PBBFAC,,, | Performed by: NURSE PRACTITIONER

## 2023-05-03 PROCEDURE — 99999 PR PBB SHADOW E&M-EST. PATIENT-LVL III: ICD-10-PCS | Mod: PBBFAC,,, | Performed by: NURSE PRACTITIONER

## 2023-05-03 PROCEDURE — 3074F PR MOST RECENT SYSTOLIC BLOOD PRESSURE < 130 MM HG: ICD-10-PCS | Mod: CPTII,S$GLB,, | Performed by: NURSE PRACTITIONER

## 2023-05-03 PROCEDURE — 1159F MED LIST DOCD IN RCRD: CPT | Mod: CPTII,S$GLB,, | Performed by: NURSE PRACTITIONER

## 2023-05-03 PROCEDURE — 1160F PR REVIEW ALL MEDS BY PRESCRIBER/CLIN PHARMACIST DOCUMENTED: ICD-10-PCS | Mod: CPTII,S$GLB,, | Performed by: NURSE PRACTITIONER

## 2023-05-03 PROCEDURE — 3008F BODY MASS INDEX DOCD: CPT | Mod: CPTII,S$GLB,, | Performed by: NURSE PRACTITIONER

## 2023-05-03 PROCEDURE — 3079F DIAST BP 80-89 MM HG: CPT | Mod: CPTII,S$GLB,, | Performed by: NURSE PRACTITIONER

## 2023-05-03 PROCEDURE — 3074F SYST BP LT 130 MM HG: CPT | Mod: CPTII,S$GLB,, | Performed by: NURSE PRACTITIONER

## 2023-05-03 PROCEDURE — 3008F PR BODY MASS INDEX (BMI) DOCUMENTED: ICD-10-PCS | Mod: CPTII,S$GLB,, | Performed by: NURSE PRACTITIONER

## 2023-05-03 PROCEDURE — 99213 OFFICE O/P EST LOW 20 MIN: CPT | Mod: S$GLB,,, | Performed by: NURSE PRACTITIONER

## 2023-05-03 NOTE — PROGRESS NOTES
HEMATOLOGY/ONCOLOGY OFFICE CLINIC VISIT      Visit Information:    Initial Evaluation: 10/12/2022  Referring Provider: Dr Mancia  Other providers:  Code status:     Diagnosis:  MGUS  Leukopenia     Present treatment:     Treatment/Oncology history:     Plan of care: MM w/u      Imaging:      Pathology:    CLINICAL HISTORY:       Patient: Adrien Betrrand is a 52 y.o. female  kindly referred for monoclonal gammopathy.   Patient was being evaluated for rheumatoid arthritis when her rheumatoid factor was elevated.  Quantitative immunoglobulins were performed they will normal.  Serum protein electrophoresis showed an M spike of 0.3.  Blood also showed a white blood cell counts of 2.8.     Reviewing EMR Leukopenia-chronic and at least 2018 ranging from 3.8-4.4. On 2015 she had  WBC 4.4  Peripheral smear in 2016: Normocytic hypochromic red blood cell population with no significant anisocytosis.  Unremarkable granulocytic maturation, numerous monocytes, mature lymphocytes, rare eosinophils. No circulating blast forms.  Unremarkable platelet morphology.  She has been with mild iron deficiency off and on as well.       Patient does report fatigue and tiredness and of an of migraine headache.  Otherwise she is doing well and denies any fever, chills, sweats.  No chest pain or short of breath.  No changes in bowel habits.  She is  and have teenagers, triplets at home.      Chief Complaint: OTHER (No concerns today.)      Interval History:   Patient  presents today for 6 month f/u. She complains of joint & muscle aches and pains. This is chronic. She sees Dr. Mancia. Reports increased stress due to have 13 year triplets as a single mom. She denies fever, chills, sweats.  No chest pain or short of breath.  No changes in bowel habits.  Discuss blood work in details with her.  Monoclonal gammopathy with an M spike of 0.12.      ROS:  All 14 points ROS taken and as per Interval  "History    Histories:  PMH/PSH/FH/SOCIAL/ALLERGIES AND MEDS REVIEWED AND UPDATED AS APPROPRIATE       Vitals:    05/03/23 1454   BP: 123/83   BP Location: Left arm   Patient Position: Sitting   Pulse: 84   Temp: 97.5 °F (36.4 °C)   TempSrc: Oral   SpO2: 100%   Weight: 57.4 kg (126 lb 9.6 oz)   Height: 5' 9" (1.753 m)      Physical Exam  Constitutional:       Appearance: Normal appearance.   HENT:      Head: Normocephalic.   Eyes:      Extraocular Movements: Extraocular movements intact.   Pulmonary:      Effort: Pulmonary effort is normal.   Musculoskeletal:      Cervical back: Neck supple.   Neurological:      Mental Status: She is alert.   Psychiatric:         Mood and Affect: Mood normal.         Behavior: Behavior normal.         Thought Content: Thought content normal.         Judgment: Judgment normal.     ECOG SCORE             Laboratory:  CBC with Differential:  Lab Results   Component Value Date    WBC 4.5 04/25/2023    RBC 4.30 04/25/2023    HGB 12.5 04/25/2023    HCT 39.8 04/25/2023    MCV 92.6 04/25/2023    MCH 29.1 04/25/2023    MCHC 31.4 (L) 04/25/2023    RDW 11.8 04/25/2023     04/25/2023    MPV 8.9 04/25/2023      Monoclonal IgG Kappa is present in the gamma region.   The m-spike = 0.12gm/dl.     CMP:  Sodium Level   Date Value Ref Range Status   04/25/2023 139 136 - 145 mmol/L Final     Potassium Level   Date Value Ref Range Status   04/25/2023 4.4 3.5 - 5.1 mmol/L Final     Carbon Dioxide   Date Value Ref Range Status   04/25/2023 31 (H) 22 - 29 mmol/L Final     Blood Urea Nitrogen   Date Value Ref Range Status   04/25/2023 16.0 9.8 - 20.1 mg/dL Final     Creatinine   Date Value Ref Range Status   04/25/2023 0.97 0.55 - 1.02 mg/dL Final     Calcium Level Total   Date Value Ref Range Status   04/25/2023 9.8 8.4 - 10.2 mg/dL Final     Albumin Level   Date Value Ref Range Status   04/25/2023 4.0 3.5 - 5.0 g/dL Final   04/25/2023 3.7 3.5 - 5.0 g/dL Final     Bilirubin Total   Date Value Ref " Range Status   04/25/2023 0.2 <=1.5 mg/dL Final     Alkaline Phosphatase   Date Value Ref Range Status   04/25/2023 73 40 - 150 unit/L Final     Aspartate Aminotransferase   Date Value Ref Range Status   04/25/2023 16 5 - 34 unit/L Final     Alanine Aminotransferase   Date Value Ref Range Status   04/25/2023 13 0 - 55 unit/L Final             Assessment:       1. Monoclonal gammopathy         MGUS  Leukopenia-chronic and at least 2018 ranging from 3.8-4.4. On 2015 she had  WBC 4.4      Plan:       The risk of progression to MM or other lymphoproliferative disorder is present at a constant rate throughout the remainder of a patient's life. This risk has been quantified at 1% per year. I also discussed with the patient that elevated globulin can be associated with autoimmune disorders or recent infections/inflammation.  This is a disorder of the immune system so anything that affect immune system can increase the globulin level.   Discuss blood work in details with her.  Monoclonal gammopathy with an M spike of 0.12.  Urine and skeletal survey were negative in 10/2022.  She is not anemic, calcium and kidney function normal.      Will continue surveillance every 6 months.  Will defer BM biopsy for now as she is not anemic  MM labs 1 week prior  NO 24 hrs urine collection  RTC 6 months     Encourage to call or message us for any questions or problems  The patient was given ample opportunity to ask questions, and to the best of my abilities, all questions answered to satisfaction; patient demonstrated understanding of what we discussed and agreeable to the plan.     Veronica Evans, ZITA

## 2023-06-25 ENCOUNTER — HOSPITAL ENCOUNTER (EMERGENCY)
Facility: HOSPITAL | Age: 53
Discharge: HOME | End: 2023-06-25
Attending: STUDENT IN AN ORGANIZED HEALTH CARE EDUCATION/TRAINING PROGRAM
Payer: COMMERCIAL

## 2023-06-25 VITALS
SYSTOLIC BLOOD PRESSURE: 144 MMHG | TEMPERATURE: 98 F | WEIGHT: 120 LBS | OXYGEN SATURATION: 99 % | DIASTOLIC BLOOD PRESSURE: 95 MMHG | HEART RATE: 81 BPM | RESPIRATION RATE: 16 BRPM | BODY MASS INDEX: 17.72 KG/M2

## 2023-06-25 DIAGNOSIS — S00.262A INSECT BITE OF LEFT PERIOCULAR AREA, INITIAL ENCOUNTER: ICD-10-CM

## 2023-06-25 DIAGNOSIS — L03.90 CELLULITIS, UNSPECIFIED CELLULITIS SITE: Primary | ICD-10-CM
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
## 2023-06-25 PROCEDURE — 96372 THER/PROPH/DIAG INJ SC/IM: CPT | Performed by: STUDENT IN AN ORGANIZED HEALTH CARE EDUCATION/TRAINING PROGRAM

## 2023-06-25 PROCEDURE — 99284 EMERGENCY DEPT VISIT MOD MDM: CPT

## 2023-06-25 PROCEDURE — 63600175 PHARM REV CODE 636 W HCPCS: Performed by: STUDENT IN AN ORGANIZED HEALTH CARE EDUCATION/TRAINING PROGRAM

## 2023-06-25 RX ORDER — PREDNISONE 20 MG/1
40 TABLET ORAL DAILY
Qty: 10 TABLET | Refills: 0 | Status: SHIPPED | OUTPATIENT
Start: 2023-06-25 | End: 2023-06-30

## 2023-06-25 RX ORDER — DEXAMETHASONE SODIUM PHOSPHATE 4 MG/ML
8 INJECTION, SOLUTION INTRA-ARTICULAR; INTRALESIONAL; INTRAMUSCULAR; INTRAVENOUS; SOFT TISSUE
Status: COMPLETED | OUTPATIENT
Start: 2023-06-25 | End: 2023-06-25

## 2023-06-25 RX ADMIN — DEXAMETHASONE SODIUM PHOSPHATE 8 MG: 4 INJECTION, SOLUTION INTRA-ARTICULAR; INTRALESIONAL; INTRAMUSCULAR; INTRAVENOUS; SOFT TISSUE at 09:06

## 2023-06-25 NOTE — ED PROVIDER NOTES
Encounter Date: 2023       History     Chief Complaint   Patient presents with    skin concern     Pt presents with concerns of redness and black spot to just above left eyebrow; pt states she noticed the black spot Friday morning, redness began same day and progressively got worse; pt states area itches a little bit; also pt states she is to have a procedure to remove basal cell carcinoma from face on Tuesday      52 F Pt presents with concerns of redness and black spot to just above left eyebrow; pt states she noticed the black spot Friday morning, redness began same day and progressively got worse; pt states area itches a little bit; also pt states she is to have a procedure to remove basal cell carcinoma from face on Tuesday .states she was working outside that day, does not recall being bit by insect      Review of patient's allergies indicates:  No Known Allergies  Past Medical History:   Diagnosis Date    Hypothyroidism, unspecified     Migraine      Past Surgical History:   Procedure Laterality Date     SECTION      right thyroid lobectomy and isthmusectomy      SINUS SURGERY      TUBAL LIGATION       No family history on file.  Social History     Tobacco Use    Smoking status: Never    Smokeless tobacco: Never   Substance Use Topics    Alcohol use: Never    Drug use: Never     Review of Systems   Skin:         Neg except as stated   All other systems reviewed and are negative.    Physical Exam     Initial Vitals [23 0900]   BP Pulse Resp Temp SpO2   (!) 144/95 81 16 98.1 °F (36.7 °C) 99 %      MAP       --         Physical Exam    Nursing note and vitals reviewed.  Constitutional: She appears well-developed and well-nourished.   HENT:   Head: Normocephalic.   Eyes: EOM are normal. Pupils are equal, round, and reactive to light.   Neck:   Normal range of motion.  Cardiovascular:  Normal rate, regular rhythm, normal heart sounds, intact distal pulses and normal pulses.            Pulmonary/Chest: Breath sounds normal. No respiratory distress.   Abdominal: Abdomen is soft. Bowel sounds are normal. There is no abdominal tenderness.   Musculoskeletal:         General: Normal range of motion.      Cervical back: Normal range of motion.     Neurological: She is alert and oriented to person, place, and time. GCS score is 15. GCS eye subscore is 4. GCS verbal subscore is 5. GCS motor subscore is 6.   Skin: Skin is warm. Capillary refill takes less than 2 seconds.   Edema, mild erythema to L forehead/lateral orbital region, 0.3mm hyperpigmented blister to center, clear fluid expressed, nontender   Psychiatric: She has a normal mood and affect.       ED Course   Procedures  Labs Reviewed - No data to display       Imaging Results    None          Medications   dexAMETHasone injection 8 mg (has no administration in time range)     Medical Decision Making:   Differential Diagnosis:   Insect bite, contact dermatitis, malignancy, other dermatological issue  ED Management:  Hyperpigmented area seems to be a blister from an insect bite, clear fluid expressed, edema does not affect immediate eye area, given steroid shot in ER as well as p.o. home regimen to start tomorrow advised to follow up with PCP/derm as scheduled.  May take prescribed antibiotics for procedure as scheduled.    ER precautions given all questions concerns addressed to the patient's satisfaction.  The patient is resting comfortably in no acute distress.  She is hemodynamically stable and is without objective evidence for acute process requiring urgent intervention or hospitalization. I provided counseling to patient with regard to condition, the treatment plan, specific conditions for return, and the importance of follow up. Detailed written and verbal instructions provided to patient and he expressed a verbal understanding of the discharge instructions and overall management plan. Reiterated the importance of medication administration  and safety and advised patient to follow up with primary care provider in 3-5 days or sooner if needed.  Answered questions at this time. The patient is stable for discharge.                           Clinical Impression:   Final diagnoses:  [L03.90] Cellulitis, unspecified cellulitis site (Primary)  [S00.262A, W57.XXXA] Insect bite of left periocular area, initial encounter        ED Disposition Condition    Discharge Stable          ED Prescriptions       Medication Sig Dispense Start Date End Date Auth. Provider    predniSONE (DELTASONE) 20 MG tablet Take 2 tablets (40 mg total) by mouth once daily. for 5 days 10 tablet 6/25/2023 6/30/2023 Tosha Sepulveda MD          Follow-up Information       Follow up With Specialties Details Why Contact Info    Jong Zaman MD Family Medicine Schedule an appointment as soon as possible for a visit   [address] [place] [id number]  [phone]               Tosha Sepulveda MD  06/25/23 0877

## 2023-11-02 ENCOUNTER — LAB VISIT (OUTPATIENT)
Dept: LAB | Facility: HOSPITAL | Age: 53
End: 2023-11-02
Attending: INTERNAL MEDICINE
Payer: COMMERCIAL

## 2023-11-02 DIAGNOSIS — D47.2 MONOCLONAL GAMMOPATHY: ICD-10-CM

## 2023-11-02 LAB
ALBUMIN SERPL-MCNC: 3.9 G/DL (ref 3.5–5)
ALBUMIN/GLOB SERPL: 1.4 RATIO (ref 1.1–2)
ALP SERPL-CCNC: 64 UNIT/L (ref 40–150)
ALT SERPL-CCNC: 20 UNIT/L (ref 0–55)
AST SERPL-CCNC: 19 UNIT/L (ref 5–34)
BASOPHILS # BLD AUTO: 0.01 X10(3)/MCL
BASOPHILS NFR BLD AUTO: 0.2 %
BILIRUB SERPL-MCNC: 0.4 MG/DL
BUN SERPL-MCNC: 12.1 MG/DL (ref 9.8–20.1)
CALCIUM SERPL-MCNC: 9.5 MG/DL (ref 8.4–10.2)
CHLORIDE SERPL-SCNC: 104 MMOL/L (ref 98–107)
CO2 SERPL-SCNC: 28 MMOL/L (ref 22–29)
CREAT SERPL-MCNC: 0.83 MG/DL (ref 0.55–1.02)
EOSINOPHIL # BLD AUTO: 0.19 X10(3)/MCL (ref 0–0.9)
EOSINOPHIL NFR BLD AUTO: 4.4 %
ERYTHROCYTE [DISTWIDTH] IN BLOOD BY AUTOMATED COUNT: 11.8 % (ref 11.5–17)
GFR SERPLBLD CREATININE-BSD FMLA CKD-EPI: >60 MLS/MIN/1.73/M2
GLOBULIN SER-MCNC: 2.8 GM/DL (ref 2.4–3.5)
GLUCOSE SERPL-MCNC: 91 MG/DL (ref 74–100)
HCT VFR BLD AUTO: 41.1 % (ref 37–47)
HGB BLD-MCNC: 12.4 G/DL (ref 12–16)
IGA SERPL-MCNC: 162 MG/DL (ref 65–421)
IGG SERPL-MCNC: 1002 MG/DL (ref 522–1631)
IGM SERPL-MCNC: 63 MG/DL (ref 33–293)
IMM GRANULOCYTES # BLD AUTO: 0 X10(3)/MCL (ref 0–0.04)
IMM GRANULOCYTES NFR BLD AUTO: 0 %
LYMPHOCYTES # BLD AUTO: 1.24 X10(3)/MCL (ref 0.6–4.6)
LYMPHOCYTES NFR BLD AUTO: 28.8 %
MCH RBC QN AUTO: 28.2 PG (ref 27–31)
MCHC RBC AUTO-ENTMCNC: 30.2 G/DL (ref 33–36)
MCV RBC AUTO: 93.4 FL (ref 80–94)
MONOCYTES # BLD AUTO: 0.28 X10(3)/MCL (ref 0.1–1.3)
MONOCYTES NFR BLD AUTO: 6.5 %
NEUTROPHILS # BLD AUTO: 2.58 X10(3)/MCL (ref 2.1–9.2)
NEUTROPHILS NFR BLD AUTO: 60.1 %
PLATELET # BLD AUTO: 209 X10(3)/MCL (ref 130–400)
PMV BLD AUTO: 9 FL (ref 7.4–10.4)
POTASSIUM SERPL-SCNC: 4.5 MMOL/L (ref 3.5–5.1)
PROT SERPL-MCNC: 6.7 GM/DL (ref 6.4–8.3)
RBC # BLD AUTO: 4.4 X10(6)/MCL (ref 4.2–5.4)
SODIUM SERPL-SCNC: 141 MMOL/L (ref 136–145)
WBC # SPEC AUTO: 4.3 X10(3)/MCL (ref 4.5–11.5)

## 2023-11-02 PROCEDURE — 85025 COMPLETE CBC W/AUTO DIFF WBC: CPT

## 2023-11-02 PROCEDURE — 82784 ASSAY IGA/IGD/IGG/IGM EACH: CPT

## 2023-11-02 PROCEDURE — 86334 IMMUNOFIX E-PHORESIS SERUM: CPT

## 2023-11-02 PROCEDURE — 83521 IG LIGHT CHAINS FREE EACH: CPT

## 2023-11-02 PROCEDURE — 80053 COMPREHEN METABOLIC PANEL: CPT

## 2023-11-02 PROCEDURE — 84165 PROTEIN E-PHORESIS SERUM: CPT

## 2023-11-02 PROCEDURE — 36415 COLL VENOUS BLD VENIPUNCTURE: CPT

## 2023-11-03 LAB
ALBUMIN % SPEP (OHS): 54.75
ALBUMIN SERPL-MCNC: 3.6 G/DL (ref 3.5–5)
ALBUMIN/GLOB SERPL: 1.2 RATIO (ref 1.1–2)
ALPHA 1 GLOB (OHS): 0.24 GM/DL
ALPHA 1 GLOB% (OHS): 3.68
ALPHA 2 GLOB % (OHS): 11.34
ALPHA 2 GLOB (OHS): 0.75 GM/DL
BETA GLOB (OHS): 0.92 GM/DL
BETA GLOB% (OHS): 13.94
GAMMA GLOBULIN % (OHS): 16.29
GAMMA GLOBULIN (OHS): 1.08 GM/DL
GLOBULIN SER-MCNC: 3 GM/DL (ref 2.4–3.5)
M SPIKE % (OHS): 1.24
M SPIKE (OHS): 0.08 GM/DL
PATH REV: NORMAL
PROT SERPL-MCNC: 6.6 GM/DL (ref 6.4–8.3)

## 2023-11-06 LAB
KAPPA LC FREE SER-MCNC: 1.29 MG/DL (ref 0.33–1.94)
KAPPA LC FREE/LAMBDA FREE SER: 0.82 {RATIO} (ref 0.26–1.65)
LAMBDA LC FREE SERPL-MCNC: 1.58 MG/DL (ref 0.57–2.63)

## 2023-11-09 ENCOUNTER — OFFICE VISIT (OUTPATIENT)
Dept: HEMATOLOGY/ONCOLOGY | Facility: CLINIC | Age: 53
End: 2023-11-09
Payer: COMMERCIAL

## 2023-11-09 VITALS
RESPIRATION RATE: 18 BRPM | WEIGHT: 140.88 LBS | BODY MASS INDEX: 20.87 KG/M2 | HEART RATE: 89 BPM | DIASTOLIC BLOOD PRESSURE: 86 MMHG | SYSTOLIC BLOOD PRESSURE: 130 MMHG | OXYGEN SATURATION: 97 % | TEMPERATURE: 98 F | HEIGHT: 69 IN

## 2023-11-09 DIAGNOSIS — D47.2 MONOCLONAL GAMMOPATHY: Primary | ICD-10-CM

## 2023-11-09 PROCEDURE — 99213 PR OFFICE/OUTPT VISIT, EST, LEVL III, 20-29 MIN: ICD-10-PCS | Mod: S$GLB,,, | Performed by: NURSE PRACTITIONER

## 2023-11-09 PROCEDURE — 3079F PR MOST RECENT DIASTOLIC BLOOD PRESSURE 80-89 MM HG: ICD-10-PCS | Mod: CPTII,S$GLB,, | Performed by: NURSE PRACTITIONER

## 2023-11-09 PROCEDURE — 99999 PR PBB SHADOW E&M-EST. PATIENT-LVL IV: CPT | Mod: PBBFAC,,, | Performed by: NURSE PRACTITIONER

## 2023-11-09 PROCEDURE — 99999 PR PBB SHADOW E&M-EST. PATIENT-LVL IV: ICD-10-PCS | Mod: PBBFAC,,, | Performed by: NURSE PRACTITIONER

## 2023-11-09 PROCEDURE — 3079F DIAST BP 80-89 MM HG: CPT | Mod: CPTII,S$GLB,, | Performed by: NURSE PRACTITIONER

## 2023-11-09 PROCEDURE — 1160F RVW MEDS BY RX/DR IN RCRD: CPT | Mod: CPTII,S$GLB,, | Performed by: NURSE PRACTITIONER

## 2023-11-09 PROCEDURE — 3075F SYST BP GE 130 - 139MM HG: CPT | Mod: CPTII,S$GLB,, | Performed by: NURSE PRACTITIONER

## 2023-11-09 PROCEDURE — 3008F PR BODY MASS INDEX (BMI) DOCUMENTED: ICD-10-PCS | Mod: CPTII,S$GLB,, | Performed by: NURSE PRACTITIONER

## 2023-11-09 PROCEDURE — 1159F PR MEDICATION LIST DOCUMENTED IN MEDICAL RECORD: ICD-10-PCS | Mod: CPTII,S$GLB,, | Performed by: NURSE PRACTITIONER

## 2023-11-09 PROCEDURE — 1160F PR REVIEW ALL MEDS BY PRESCRIBER/CLIN PHARMACIST DOCUMENTED: ICD-10-PCS | Mod: CPTII,S$GLB,, | Performed by: NURSE PRACTITIONER

## 2023-11-09 PROCEDURE — 1159F MED LIST DOCD IN RCRD: CPT | Mod: CPTII,S$GLB,, | Performed by: NURSE PRACTITIONER

## 2023-11-09 PROCEDURE — 3008F BODY MASS INDEX DOCD: CPT | Mod: CPTII,S$GLB,, | Performed by: NURSE PRACTITIONER

## 2023-11-09 PROCEDURE — 99213 OFFICE O/P EST LOW 20 MIN: CPT | Mod: S$GLB,,, | Performed by: NURSE PRACTITIONER

## 2023-11-09 PROCEDURE — 3075F PR MOST RECENT SYSTOLIC BLOOD PRESS GE 130-139MM HG: ICD-10-PCS | Mod: CPTII,S$GLB,, | Performed by: NURSE PRACTITIONER

## 2023-11-09 RX ORDER — ESCITALOPRAM OXALATE 20 MG/1
20 TABLET ORAL
COMMUNITY
Start: 2023-10-16

## 2023-11-09 RX ORDER — ALBUTEROL SULFATE 90 UG/1
AEROSOL, METERED RESPIRATORY (INHALATION)
COMMUNITY

## 2023-11-09 NOTE — PROGRESS NOTES
HEMATOLOGY/ONCOLOGY OFFICE CLINIC VISIT      Visit Information:    Initial Evaluation: 10/12/2022  Referring Provider: Dr Mancia  Other providers:  Code status:     Diagnosis:  MGUS  Leukopenia     Present treatment:     Treatment/Oncology history:     Plan of care: MM w/u      Imaging:      Pathology:    CLINICAL HISTORY:       Patient: Adrien Bertrand is a 53 y.o. female  kindly referred for monoclonal gammopathy.   Patient was being evaluated for rheumatoid arthritis when her rheumatoid factor was elevated.  Quantitative immunoglobulins were performed they will normal.  Serum protein electrophoresis showed an M spike of 0.3.  Blood also showed a white blood cell counts of 2.8.     Reviewing EMR Leukopenia-chronic and at least 2018 ranging from 3.8-4.4. On 2015 she had  WBC 4.4  Peripheral smear in 2016: Normocytic hypochromic red blood cell population with no significant anisocytosis.  Unremarkable granulocytic maturation, numerous monocytes, mature lymphocytes, rare eosinophils. No circulating blast forms.  Unremarkable platelet morphology.  She has been with mild iron deficiency off and on as well.       Patient does report fatigue and tiredness and of an of migraine headache.  Otherwise she is doing well and denies any fever, chills, sweats.  No chest pain or short of breath.  No changes in bowel habits.  She is  and have teenagers, triplets at home.      Chief Complaint: OTHER (No concerns today.)      Interval History:   Patient  presents today for 6 month f/u. She complains of joint & muscle aches and pains. This is chronic. She sees Dr. Mancia. Reports increased stress due to have 13 year triplets as a single mom. She is also and . She denies fever, chills, sweats.  No chest pain or short of breath.  No changes in bowel habits.  Discuss blood work in details with her.  Monoclonal gammopathy with stable M spike.    ROS:  All 14 points ROS taken and as per Interval  History    Histories:  PMH/PSH/FH/SOCIAL/ALLERGIES AND MEDS REVIEWED AND UPDATED AS APPROPRIATE       Vitals:    11/09/23 1459   Weight: 63.9 kg (140 lb 14.4 oz)      Physical Exam  Constitutional:       Appearance: Normal appearance.   HENT:      Head: Normocephalic.   Eyes:      Extraocular Movements: Extraocular movements intact.   Pulmonary:      Effort: Pulmonary effort is normal.   Musculoskeletal:      Cervical back: Neck supple.   Neurological:      Mental Status: She is alert.   Psychiatric:         Mood and Affect: Mood normal.         Behavior: Behavior normal.         Thought Content: Thought content normal.         Judgment: Judgment normal.       ECOG SCORE             Laboratory:  CBC with Differential:  Lab Results   Component Value Date    WBC 4.30 (L) 11/02/2023    RBC 4.40 11/02/2023    HGB 12.4 11/02/2023    HCT 41.1 11/02/2023    MCV 93.4 11/02/2023    MCH 28.2 11/02/2023    MCHC 30.2 (L) 11/02/2023    RDW 11.8 11/02/2023     11/02/2023    MPV 9.0 11/02/2023      Monoclonal IgG Kappa is present in the gamma region.   The m-spike = 0.12gm/dl.     CMP:  Sodium Level   Date Value Ref Range Status   11/02/2023 141 136 - 145 mmol/L Final     Potassium Level   Date Value Ref Range Status   11/02/2023 4.5 3.5 - 5.1 mmol/L Final     Carbon Dioxide   Date Value Ref Range Status   11/02/2023 28 22 - 29 mmol/L Final     Blood Urea Nitrogen   Date Value Ref Range Status   11/02/2023 12.1 9.8 - 20.1 mg/dL Final     Creatinine   Date Value Ref Range Status   11/02/2023 0.83 0.55 - 1.02 mg/dL Final     Calcium Level Total   Date Value Ref Range Status   11/02/2023 9.5 8.4 - 10.2 mg/dL Final     Albumin Level   Date Value Ref Range Status   11/02/2023 3.9 3.5 - 5.0 g/dL Final   11/02/2023 3.6 3.5 - 5.0 g/dL Final     Bilirubin Total   Date Value Ref Range Status   11/02/2023 0.4 <=1.5 mg/dL Final     Alkaline Phosphatase   Date Value Ref Range Status   11/02/2023 64 40 - 150 unit/L Final     Aspartate  Aminotransferase   Date Value Ref Range Status   11/02/2023 19 5 - 34 unit/L Final     Alanine Aminotransferase   Date Value Ref Range Status   11/02/2023 20 0 - 55 unit/L Final             Assessment:       No diagnosis found.       MGUS  Leukopenia-chronic and at least 2018 ranging from 3.8-4.4. On 2015 she had  WBC 4.4      Plan:       The risk of progression to MM or other lymphoproliferative disorder is present at a constant rate throughout the remainder of a patient's life. This risk has been quantified at 1% per year. I also discussed with the patient that elevated globulin can be associated with autoimmune disorders or recent infections/inflammation.  This is a disorder of the immune system so anything that affect immune system can increase the globulin level.   Discuss blood work in details with her.  Monoclonal gammopathy with an M spike of 0.08.  Urine and skeletal survey were negative in 10/2022.  She is not anemic, calcium and kidney function normal.      Will continue surveillance every 6 months.  Will defer BM biopsy for now as she is not anemic  MM labs 1 week prior  NO 24 hrs urine collection  RTC 6 months     Encourage to call or message us for any questions or problems  The patient was given ample opportunity to ask questions, and to the best of my abilities, all questions answered to satisfaction; patient demonstrated understanding of what we discussed and agreeable to the plan.     ZITA Muniz

## 2024-03-23 ENCOUNTER — LAB VISIT (OUTPATIENT)
Dept: LAB | Facility: HOSPITAL | Age: 54
End: 2024-03-23
Attending: FAMILY MEDICINE
Payer: COMMERCIAL

## 2024-03-23 DIAGNOSIS — Z00.00 ROUTINE GENERAL MEDICAL EXAMINATION AT A HEALTH CARE FACILITY: Primary | ICD-10-CM

## 2024-03-23 DIAGNOSIS — Z79.899 ENCOUNTER FOR LONG-TERM (CURRENT) USE OF OTHER MEDICATIONS: ICD-10-CM

## 2024-03-23 DIAGNOSIS — E89.0 POSTSURGICAL HYPOTHYROIDISM: ICD-10-CM

## 2024-03-23 DIAGNOSIS — E78.5 HYPERLIPIDEMIA, UNSPECIFIED HYPERLIPIDEMIA TYPE: ICD-10-CM

## 2024-03-23 DIAGNOSIS — D50.9 IRON DEFICIENCY ANEMIA, UNSPECIFIED: ICD-10-CM

## 2024-03-23 DIAGNOSIS — E04.2 NONTOXIC MULTINODULAR GOITER: ICD-10-CM

## 2024-03-23 LAB
ALBUMIN SERPL-MCNC: 3.6 G/DL (ref 3.5–5)
ALBUMIN/GLOB SERPL: 1 RATIO (ref 1.1–2)
ALP SERPL-CCNC: 76 UNIT/L (ref 40–150)
ALT SERPL-CCNC: 15 UNIT/L (ref 0–55)
AST SERPL-CCNC: 18 UNIT/L (ref 5–34)
BASOPHILS # BLD AUTO: 0.01 X10(3)/MCL
BASOPHILS NFR BLD AUTO: 0.2 %
BILIRUB SERPL-MCNC: 0.3 MG/DL
BUN SERPL-MCNC: 15.2 MG/DL (ref 9.8–20.1)
CALCIUM SERPL-MCNC: 9.4 MG/DL (ref 8.4–10.2)
CHLORIDE SERPL-SCNC: 107 MMOL/L (ref 98–107)
CHOLEST SERPL-MCNC: 245 MG/DL
CHOLEST/HDLC SERPL: 3 {RATIO} (ref 0–5)
CO2 SERPL-SCNC: 26 MMOL/L (ref 22–29)
CREAT SERPL-MCNC: 0.82 MG/DL (ref 0.55–1.02)
EOSINOPHIL # BLD AUTO: 0.07 X10(3)/MCL (ref 0–0.9)
EOSINOPHIL NFR BLD AUTO: 1.3 %
ERYTHROCYTE [DISTWIDTH] IN BLOOD BY AUTOMATED COUNT: 12.1 % (ref 11.5–17)
FT4I SERPL CALC-MCNC: 2.99 (ref 2.6–3.6)
GFR SERPLBLD CREATININE-BSD FMLA CKD-EPI: >60 MLS/MIN/1.73/M2
GLOBULIN SER-MCNC: 3.5 GM/DL (ref 2.4–3.5)
GLUCOSE SERPL-MCNC: 76 MG/DL (ref 74–100)
HCT VFR BLD AUTO: 42.9 % (ref 37–47)
HDLC SERPL-MCNC: 87 MG/DL (ref 35–60)
HGB BLD-MCNC: 13.4 G/DL (ref 12–16)
IMM GRANULOCYTES # BLD AUTO: 0.01 X10(3)/MCL (ref 0–0.04)
IMM GRANULOCYTES NFR BLD AUTO: 0.2 %
LDLC SERPL CALC-MCNC: 146 MG/DL (ref 50–140)
LYMPHOCYTES # BLD AUTO: 1.26 X10(3)/MCL (ref 0.6–4.6)
LYMPHOCYTES NFR BLD AUTO: 23.6 %
MCH RBC QN AUTO: 28.2 PG (ref 27–31)
MCHC RBC AUTO-ENTMCNC: 31.2 G/DL (ref 33–36)
MCV RBC AUTO: 90.1 FL (ref 80–94)
MONOCYTES # BLD AUTO: 0.35 X10(3)/MCL (ref 0.1–1.3)
MONOCYTES NFR BLD AUTO: 6.6 %
NEUTROPHILS # BLD AUTO: 3.64 X10(3)/MCL (ref 2.1–9.2)
NEUTROPHILS NFR BLD AUTO: 68.1 %
PLATELET # BLD AUTO: 276 X10(3)/MCL (ref 130–400)
PMV BLD AUTO: 9.1 FL (ref 7.4–10.4)
POTASSIUM SERPL-SCNC: 4.1 MMOL/L (ref 3.5–5.1)
PROT SERPL-MCNC: 7.1 GM/DL (ref 6.4–8.3)
RBC # BLD AUTO: 4.76 X10(6)/MCL (ref 4.2–5.4)
SODIUM SERPL-SCNC: 143 MMOL/L (ref 136–145)
T3RU NFR SERPL: 31.22 % (ref 31–39)
T4 SERPL-MCNC: 9.59 UG/DL (ref 4.87–11.72)
TRIGL SERPL-MCNC: 59 MG/DL (ref 37–140)
TSH SERPL-ACNC: 0.44 UIU/ML (ref 0.35–4.94)
VLDLC SERPL CALC-MCNC: 12 MG/DL
WBC # SPEC AUTO: 5.34 X10(3)/MCL (ref 4.5–11.5)

## 2024-03-23 PROCEDURE — 85025 COMPLETE CBC W/AUTO DIFF WBC: CPT

## 2024-03-23 PROCEDURE — 84436 ASSAY OF TOTAL THYROXINE: CPT

## 2024-03-23 PROCEDURE — 84443 ASSAY THYROID STIM HORMONE: CPT

## 2024-03-23 PROCEDURE — 36415 COLL VENOUS BLD VENIPUNCTURE: CPT

## 2024-03-23 PROCEDURE — 80061 LIPID PANEL: CPT

## 2024-03-23 PROCEDURE — 80053 COMPREHEN METABOLIC PANEL: CPT

## 2024-04-25 ENCOUNTER — LAB VISIT (OUTPATIENT)
Dept: LAB | Facility: HOSPITAL | Age: 54
End: 2024-04-25
Attending: NURSE PRACTITIONER
Payer: COMMERCIAL

## 2024-04-25 DIAGNOSIS — D47.2 MONOCLONAL GAMMOPATHY: ICD-10-CM

## 2024-04-25 LAB
ALBUMIN SERPL-MCNC: 4 G/DL (ref 3.5–5)
ALBUMIN/GLOB SERPL: 1.5 RATIO (ref 1.1–2)
ALP SERPL-CCNC: 76 UNIT/L (ref 40–150)
ALT SERPL-CCNC: 15 UNIT/L (ref 0–55)
AST SERPL-CCNC: 19 UNIT/L (ref 5–34)
BASOPHILS # BLD AUTO: 0.01 X10(3)/MCL
BASOPHILS NFR BLD AUTO: 0.2 %
BILIRUB SERPL-MCNC: 0.3 MG/DL
BUN SERPL-MCNC: 15.3 MG/DL (ref 9.8–20.1)
CALCIUM SERPL-MCNC: 9 MG/DL (ref 8.4–10.2)
CHLORIDE SERPL-SCNC: 106 MMOL/L (ref 98–107)
CO2 SERPL-SCNC: 25 MMOL/L (ref 22–29)
CREAT SERPL-MCNC: 1.3 MG/DL (ref 0.55–1.02)
EOSINOPHIL # BLD AUTO: 0.11 X10(3)/MCL (ref 0–0.9)
EOSINOPHIL NFR BLD AUTO: 2.4 %
ERYTHROCYTE [DISTWIDTH] IN BLOOD BY AUTOMATED COUNT: 12.4 % (ref 11.5–17)
GFR SERPLBLD CREATININE-BSD FMLA CKD-EPI: 49 MLS/MIN/1.73/M2
GLOBULIN SER-MCNC: 2.6 GM/DL (ref 2.4–3.5)
GLUCOSE SERPL-MCNC: 91 MG/DL (ref 74–100)
HCT VFR BLD AUTO: 37.5 % (ref 37–47)
HGB BLD-MCNC: 12.4 G/DL (ref 12–16)
IGA SERPL-MCNC: 181 MG/DL (ref 65–421)
IGG SERPL-MCNC: 1103 MG/DL (ref 522–1631)
IGM SERPL-MCNC: 65 MG/DL (ref 33–293)
IMM GRANULOCYTES # BLD AUTO: 0.01 X10(3)/MCL (ref 0–0.04)
IMM GRANULOCYTES NFR BLD AUTO: 0.2 %
LYMPHOCYTES # BLD AUTO: 1.45 X10(3)/MCL (ref 0.6–4.6)
LYMPHOCYTES NFR BLD AUTO: 31 %
MCH RBC QN AUTO: 28.8 PG (ref 27–31)
MCHC RBC AUTO-ENTMCNC: 33.1 G/DL (ref 33–36)
MCV RBC AUTO: 87.2 FL (ref 80–94)
MONOCYTES # BLD AUTO: 0.23 X10(3)/MCL (ref 0.1–1.3)
MONOCYTES NFR BLD AUTO: 4.9 %
NEUTROPHILS # BLD AUTO: 2.87 X10(3)/MCL (ref 2.1–9.2)
NEUTROPHILS NFR BLD AUTO: 61.3 %
PLATELET # BLD AUTO: 220 X10(3)/MCL (ref 130–400)
PMV BLD AUTO: 8.9 FL (ref 7.4–10.4)
POTASSIUM SERPL-SCNC: 4.1 MMOL/L (ref 3.5–5.1)
PROT SERPL-MCNC: 6.6 GM/DL (ref 6.4–8.3)
RBC # BLD AUTO: 4.3 X10(6)/MCL (ref 4.2–5.4)
SODIUM SERPL-SCNC: 139 MMOL/L (ref 136–145)
WBC # SPEC AUTO: 4.68 X10(3)/MCL (ref 4.5–11.5)

## 2024-04-25 PROCEDURE — 85025 COMPLETE CBC W/AUTO DIFF WBC: CPT

## 2024-04-25 PROCEDURE — 84165 PROTEIN E-PHORESIS SERUM: CPT

## 2024-04-25 PROCEDURE — 83521 IG LIGHT CHAINS FREE EACH: CPT

## 2024-04-25 PROCEDURE — 80053 COMPREHEN METABOLIC PANEL: CPT

## 2024-04-25 PROCEDURE — 82784 ASSAY IGA/IGD/IGG/IGM EACH: CPT

## 2024-04-25 PROCEDURE — 36415 COLL VENOUS BLD VENIPUNCTURE: CPT

## 2024-04-26 LAB
ALBUMIN % SPEP (OHS): 56.02
ALBUMIN SERPL-MCNC: 3.7 G/DL (ref 3.5–5)
ALBUMIN/GLOB SERPL: 1.3 RATIO (ref 1.1–2)
ALPHA 1 GLOB (OHS): 0.22 GM/DL
ALPHA 1 GLOB% (OHS): 3.28
ALPHA 2 GLOB % (OHS): 9.89
ALPHA 2 GLOB (OHS): 0.65 GM/DL
BETA GLOB (OHS): 0.92 GM/DL
BETA GLOB% (OHS): 13.96
GAMMA GLOBULIN % (OHS): 16.85
GAMMA GLOBULIN (OHS): 1.11 GM/DL
GLOBULIN SER-MCNC: 2.9 GM/DL (ref 2.4–3.5)
M SPIKE % (OHS): 0.9
M SPIKE (OHS): 0.06 GM/DL
PATH REV: NORMAL
PROT SERPL-MCNC: 6.6 GM/DL (ref 6.4–8.3)

## 2024-04-29 LAB
KAPPA LC FREE SER-MCNC: 1.32 MG/DL (ref 0.33–1.94)
KAPPA LC FREE/LAMBDA FREE SER: 0.95 {RATIO} (ref 0.26–1.65)
LAMBDA LC FREE SERPL-MCNC: 1.39 MG/DL (ref 0.57–2.63)

## 2024-05-09 ENCOUNTER — OFFICE VISIT (OUTPATIENT)
Dept: HEMATOLOGY/ONCOLOGY | Facility: CLINIC | Age: 54
End: 2024-05-09
Payer: COMMERCIAL

## 2024-05-09 VITALS
WEIGHT: 145.88 LBS | OXYGEN SATURATION: 98 % | TEMPERATURE: 98 F | HEART RATE: 82 BPM | HEIGHT: 69 IN | DIASTOLIC BLOOD PRESSURE: 79 MMHG | BODY MASS INDEX: 21.61 KG/M2 | SYSTOLIC BLOOD PRESSURE: 124 MMHG | RESPIRATION RATE: 14 BRPM

## 2024-05-09 DIAGNOSIS — D47.2 MONOCLONAL GAMMOPATHY: Primary | ICD-10-CM

## 2024-05-09 LAB
ANION GAP SERPL CALC-SCNC: 3 MEQ/L
BUN SERPL-MCNC: 11.6 MG/DL (ref 9.8–20.1)
CALCIUM SERPL-MCNC: 9.4 MG/DL (ref 8.4–10.2)
CHLORIDE SERPL-SCNC: 104 MMOL/L (ref 98–107)
CO2 SERPL-SCNC: 31 MMOL/L (ref 22–29)
CREAT SERPL-MCNC: 0.87 MG/DL (ref 0.55–1.02)
CREAT/UREA NIT SERPL: 13
GFR SERPLBLD CREATININE-BSD FMLA CKD-EPI: >60 ML/MIN/1.73/M2
GLUCOSE SERPL-MCNC: 82 MG/DL (ref 74–100)
POTASSIUM SERPL-SCNC: 4.8 MMOL/L (ref 3.5–5.1)
SODIUM SERPL-SCNC: 138 MMOL/L (ref 136–145)

## 2024-05-09 PROCEDURE — 3078F DIAST BP <80 MM HG: CPT | Mod: CPTII,S$GLB,, | Performed by: NURSE PRACTITIONER

## 2024-05-09 PROCEDURE — 80048 BASIC METABOLIC PNL TOTAL CA: CPT | Performed by: NURSE PRACTITIONER

## 2024-05-09 PROCEDURE — 99999 PR PBB SHADOW E&M-EST. PATIENT-LVL III: CPT | Mod: PBBFAC,,, | Performed by: NURSE PRACTITIONER

## 2024-05-09 PROCEDURE — 3008F BODY MASS INDEX DOCD: CPT | Mod: CPTII,S$GLB,, | Performed by: NURSE PRACTITIONER

## 2024-05-09 PROCEDURE — 36415 COLL VENOUS BLD VENIPUNCTURE: CPT | Performed by: NURSE PRACTITIONER

## 2024-05-09 PROCEDURE — 99214 OFFICE O/P EST MOD 30 MIN: CPT | Mod: S$GLB,,, | Performed by: NURSE PRACTITIONER

## 2024-05-09 PROCEDURE — 3074F SYST BP LT 130 MM HG: CPT | Mod: CPTII,S$GLB,, | Performed by: NURSE PRACTITIONER

## 2024-05-09 PROCEDURE — 1159F MED LIST DOCD IN RCRD: CPT | Mod: CPTII,S$GLB,, | Performed by: NURSE PRACTITIONER

## 2024-05-09 NOTE — PROGRESS NOTES
HEMATOLOGY/ONCOLOGY OFFICE CLINIC VISIT      Visit Information:    Initial Evaluation: 10/12/2022  Referring Provider: Dr Mancia  Other providers:  Code status:     Diagnosis:  MGUS  Leukopenia     Present treatment:     Treatment/Oncology history:     Plan of care: MM w/u      Imaging:      Pathology:    CLINICAL HISTORY:       Patient: Adrien Bertrand is a 53 y.o. female  kindly referred for monoclonal gammopathy.   Patient was being evaluated for rheumatoid arthritis when her rheumatoid factor was elevated.  Quantitative immunoglobulins were performed they will normal.  Serum protein electrophoresis showed an M spike of 0.3.  Blood also showed a white blood cell counts of 2.8.     Reviewing EMR Leukopenia-chronic and at least 2018 ranging from 3.8-4.4. On 2015 she had  WBC 4.4  Peripheral smear in 2016: Normocytic hypochromic red blood cell population with no significant anisocytosis.  Unremarkable granulocytic maturation, numerous monocytes, mature lymphocytes, rare eosinophils. No circulating blast forms.  Unremarkable platelet morphology.  She has been with mild iron deficiency off and on as well.       Patient does report fatigue and tiredness and of an of migraine headache.  Otherwise she is doing well and denies any fever, chills, sweats.  No chest pain or short of breath.  No changes in bowel habits.  She is  and have teenagers, triplets at home.      Chief Complaint: No chief complaint on file.      Interval History:  Patient  presents today for 6 month f/u for MGUS. She has no new complaints today. She had labs drawn on 4/25/24 . Her creatinine was 1.3 that day with eGFR of 45. This was the first time that she had these abnormalities. She reports that she drinks plenty of water. She has not had any changes to her medications. She does not take NSAIDS. She denies fever, chills, sweats.  No chest pain or short of breath.  No changes in bowel habits.  Still with MGUS but with no anemia,  "normal kappa/lambda FLC ratio. Normal calcium level .     Review of Systems   Respiratory:  Negative for cough and shortness of breath.    Cardiovascular:  Negative for chest pain.   Gastrointestinal:  Negative for abdominal pain and diarrhea.   Genitourinary:  Negative for frequency.   Musculoskeletal:  Positive for back pain.   Skin:  Negative for rash.   Neurological:  Positive for headaches.   Psychiatric/Behavioral:  The patient is nervous/anxious.        Histories:  PMH/PSH/FH/SOCIAL/ALLERGIES AND MEDS REVIEWED AND UPDATED AS APPROPRIATE       Vitals:    05/09/24 1456   BP: 124/79   Pulse: 82   Resp: 14   Temp: 97.9 °F (36.6 °C)   SpO2: 98%   Weight: 66.2 kg (145 lb 14.4 oz)   Height: 5' 9" (1.753 m)      Physical Exam  Constitutional:       Appearance: Normal appearance.   HENT:      Head: Normocephalic.   Eyes:      Extraocular Movements: Extraocular movements intact.   Pulmonary:      Effort: Pulmonary effort is normal.   Musculoskeletal:      Cervical back: Neck supple.   Neurological:      Mental Status: She is alert.   Psychiatric:         Mood and Affect: Mood normal.         Behavior: Behavior normal.         Thought Content: Thought content normal.         Judgment: Judgment normal.     ECOG SCORE             Laboratory:  CBC with Differential:  Lab Results   Component Value Date    WBC 4.68 04/25/2024    RBC 4.30 04/25/2024    HGB 12.4 04/25/2024    HCT 37.5 04/25/2024    MCV 87.2 04/25/2024    MCH 28.8 04/25/2024    MCHC 33.1 04/25/2024    RDW 12.4 04/25/2024     04/25/2024    MPV 8.9 04/25/2024      Monoclonal IgG Kappa is present in the gamma region.   The m-spike = 0.12gm/dl.     CMP:  Sodium   Date Value Ref Range Status   04/25/2024 139 136 - 145 mmol/L Final     Potassium   Date Value Ref Range Status   04/25/2024 4.1 3.5 - 5.1 mmol/L Final     CO2   Date Value Ref Range Status   04/25/2024 25 22 - 29 mmol/L Final     Blood Urea Nitrogen   Date Value Ref Range Status   04/25/2024 15.3 " 9.8 - 20.1 mg/dL Final     Creatinine   Date Value Ref Range Status   04/25/2024 1.30 (H) 0.55 - 1.02 mg/dL Final     Calcium   Date Value Ref Range Status   04/25/2024 9.0 8.4 - 10.2 mg/dL Final     Albumin   Date Value Ref Range Status   04/25/2024 4.0 3.5 - 5.0 g/dL Final   04/25/2024 3.7 3.5 - 5.0 g/dL Final     Bilirubin Total   Date Value Ref Range Status   04/25/2024 0.3 <=1.5 mg/dL Final     ALP   Date Value Ref Range Status   04/25/2024 76 40 - 150 unit/L Final     AST   Date Value Ref Range Status   04/25/2024 19 5 - 34 unit/L Final     ALT   Date Value Ref Range Status   04/25/2024 15 0 - 55 unit/L Final             Assessment:       1. Monoclonal gammopathy           MGUS  Leukopenia-chronic and at least 2018 ranging from 3.8-4.4. On 2015 she had  WBC 4.4      Plan:       The risk of progression to MM or other lymphoproliferative disorder is present at a constant rate throughout the remainder of a patient's life. This risk has been quantified at 1% per year. I also discussed with the patient that elevated globulin can be associated with autoimmune disorders or recent infections/inflammation.  This is a disorder of the immune system so anything that affect immune system can increase the globulin level.   Discuss blood work in details with her.  Monoclonal gammopathy with an M spike of 0.06.  Urine and skeletal survey were negative in 10/2022.  She is not anemic, calcium level normal. Creatinine level was elevated at 1.30 for the first time recently on 4/25/24 so a BMP was ordered today to recheck- pending. Will call her with results if still elevated.   Will continue to monitor.   MM labs 1 week prior to f/u appt in 6 months.      Encourage to call or message us for any questions or problems  The patient was given ample opportunity to ask questions, and to the best of my abilities, all questions answered to satisfaction; patient demonstrated understanding of what we discussed and agreeable to the plan.      Veronica Evans, [facility]

## 2024-06-18 ENCOUNTER — ANESTHESIA (OUTPATIENT)
Dept: ENDOSCOPY | Facility: HOSPITAL | Age: 54
End: 2024-06-18
Payer: COMMERCIAL

## 2024-06-18 ENCOUNTER — HOSPITAL ENCOUNTER (OUTPATIENT)
Facility: HOSPITAL | Age: 54
Discharge: HOME | End: 2024-06-18
Attending: INTERNAL MEDICINE | Admitting: INTERNAL MEDICINE
Payer: COMMERCIAL

## 2024-06-18 ENCOUNTER — ANESTHESIA EVENT (OUTPATIENT)
Dept: ENDOSCOPY | Facility: HOSPITAL | Age: 54
End: 2024-06-18
Payer: COMMERCIAL

## 2024-06-18 VITALS
WEIGHT: 145 LBS | HEIGHT: 60 IN | DIASTOLIC BLOOD PRESSURE: 84 MMHG | HEART RATE: 84 BPM | SYSTOLIC BLOOD PRESSURE: 124 MMHG | RESPIRATION RATE: 22 BRPM | BODY MASS INDEX: 28.47 KG/M2 | TEMPERATURE: 99 F | OXYGEN SATURATION: 99 %

## 2024-06-18 PROCEDURE — 25000003 PHARM REV CODE 250

## 2024-06-18 PROCEDURE — D9220A PRA ANESTHESIA: Mod: ANES,,, | Performed by: ANESTHESIOLOGY

## 2024-06-18 PROCEDURE — 63600175 PHARM REV CODE 636 W HCPCS

## 2024-06-18 PROCEDURE — G0105 COLORECTAL SCRN; HI RISK IND: HCPCS | Performed by: INTERNAL MEDICINE

## 2024-06-18 PROCEDURE — D9220A PRA ANESTHESIA: Mod: CRNA,,,

## 2024-06-18 PROCEDURE — 37000008 HC ANESTHESIA 1ST 15 MINUTES: Performed by: INTERNAL MEDICINE

## 2024-06-18 PROCEDURE — 37000009 HC ANESTHESIA EA ADD 15 MINS: Performed by: INTERNAL MEDICINE

## 2024-06-18 RX ORDER — PROPOFOL 10 MG/ML
VIAL (ML) INTRAVENOUS CONTINUOUS PRN
Status: DISCONTINUED | OUTPATIENT
Start: 2024-06-18 | End: 2024-06-18

## 2024-06-18 RX ORDER — LIDOCAINE HYDROCHLORIDE 10 MG/ML
1 INJECTION, SOLUTION EPIDURAL; INFILTRATION; INTRACAUDAL; PERINEURAL ONCE
OUTPATIENT
Start: 2024-06-18 | End: 2024-06-18

## 2024-06-18 RX ORDER — PROPOFOL 10 MG/ML
VIAL (ML) INTRAVENOUS
Status: COMPLETED
Start: 2024-06-18 | End: 2024-06-18

## 2024-06-18 RX ORDER — IPRATROPIUM BROMIDE AND ALBUTEROL SULFATE 2.5; .5 MG/3ML; MG/3ML
3 SOLUTION RESPIRATORY (INHALATION)
OUTPATIENT
Start: 2024-06-18

## 2024-06-18 RX ORDER — LIDOCAINE HYDROCHLORIDE 20 MG/ML
INJECTION, SOLUTION EPIDURAL; INFILTRATION; INTRACAUDAL; PERINEURAL
Status: DISCONTINUED | OUTPATIENT
Start: 2024-06-18 | End: 2024-06-18

## 2024-06-18 RX ORDER — SODIUM CHLORIDE, SODIUM GLUCONATE, SODIUM ACETATE, POTASSIUM CHLORIDE AND MAGNESIUM CHLORIDE 30; 37; 368; 526; 502 MG/100ML; MG/100ML; MG/100ML; MG/100ML; MG/100ML
INJECTION, SOLUTION INTRAVENOUS CONTINUOUS
Status: DISCONTINUED | OUTPATIENT
Start: 2024-06-18 | End: 2024-06-18 | Stop reason: HOSPADM

## 2024-06-18 RX ORDER — DIPHENHYDRAMINE HYDROCHLORIDE 50 MG/ML
25 INJECTION INTRAMUSCULAR; INTRAVENOUS EVERY 6 HOURS PRN
OUTPATIENT
Start: 2024-06-18

## 2024-06-18 RX ORDER — ONDANSETRON HYDROCHLORIDE 2 MG/ML
4 INJECTION, SOLUTION INTRAVENOUS DAILY PRN
OUTPATIENT
Start: 2024-06-18

## 2024-06-18 RX ORDER — SODIUM CHLORIDE, SODIUM GLUCONATE, SODIUM ACETATE, POTASSIUM CHLORIDE AND MAGNESIUM CHLORIDE 30; 37; 368; 526; 502 MG/100ML; MG/100ML; MG/100ML; MG/100ML; MG/100ML
INJECTION, SOLUTION INTRAVENOUS CONTINUOUS
OUTPATIENT
Start: 2024-06-18 | End: 2024-07-18

## 2024-06-18 RX ORDER — PROCHLORPERAZINE EDISYLATE 5 MG/ML
5 INJECTION INTRAMUSCULAR; INTRAVENOUS EVERY 30 MIN PRN
OUTPATIENT
Start: 2024-06-18

## 2024-06-18 RX ADMIN — PROPOFOL 100 MCG/KG/MIN: 10 INJECTION, EMULSION INTRAVENOUS at 12:06

## 2024-06-18 RX ADMIN — PROPOFOL 20 MG: 10 INJECTION, EMULSION INTRAVENOUS at 12:06

## 2024-06-18 RX ADMIN — SODIUM CHLORIDE, SODIUM GLUCONATE, SODIUM ACETATE, POTASSIUM CHLORIDE AND MAGNESIUM CHLORIDE: 526; 502; 368; 37; 30 INJECTION, SOLUTION INTRAVENOUS at 12:06

## 2024-06-18 RX ADMIN — LIDOCAINE HYDROCHLORIDE 4 ML: 20 INJECTION, SOLUTION INTRAVENOUS at 12:06

## 2024-06-18 RX ADMIN — PROPOFOL 50 MG: 10 INJECTION, EMULSION INTRAVENOUS at 12:06

## 2024-06-18 NOTE — TRANSFER OF CARE
Anesthesia Transfer of Care Note    Patient: Adrien Bertrand    Procedure(s) Performed: Procedure(s) (LRB):  COLON (N/A)    Patient location: GI    Anesthesia Type: general    Transport from OR: Transported from OR on room air with adequate spontaneous ventilation    Post pain: adequate analgesia    Post assessment: no apparent anesthetic complications and tolerated procedure well    Post vital signs: stable    Level of consciousness: awake and alert    Nausea/Vomiting: no nausea/vomiting    Complications: none    Transfer of care protocol was followed      Last vitals: Visit Vitals  /75 (BP Location: Left arm, Patient Position: Lying)   Pulse 80   Temp 37 °C (98.6 °F)   Resp 16   Ht 5' (1.524 m)   Wt 65.8 kg (145 lb)   SpO2 96%   Breastfeeding No   BMI 28.32 kg/m²

## 2024-06-18 NOTE — DISCHARGE SUMMARY
[facility]  Discharge Note  Short Stay    Procedure(s) (LRB):  COLON (N/A)      OUTCOME: Patient tolerated treatment/procedure well without complication and is now ready for discharge.    DISPOSITION: Home or Self Care    FINAL DIAGNOSIS:  <principal problem not specified>    FOLLOWUP: With primary care provider    DISCHARGE INSTRUCTIONS:  Follow up colon  in 5 years      Clinical Reference Documents Added to Patient Instructions         Document    COLONOSCOPY DISCHARGE INSTRUCTIONS (ENGLISH)    MODERATE SEDATION IN ADULTS DISCHARGE INSTRUCTIONS (ENGLISH)            TIME SPENT ON DISCHARGE: 6 minutes

## 2024-06-18 NOTE — H&P
[facility]  Gastroenterology  H&P    Patient Name: Adrien Bertrand  MRN: [id number]  Admission Date: 2024  Code Status: No Order    Attending Provider: CHRISTINA Thomas MD  Primary Care Physician: Jong Zaman MD  Principal Problem:<principal problem not specified>    Subjective:     History of Present Illness: ADRIEN ARREOLA is a 53 year old female  referred for high risk surveillance colonoscopy. She reports normal bowel movements without any rectal bleeding or melena. She denies any GI complaints such as abdominal pain, diarrhea, constipation, heartburn, reflux, dysphagia, nausea, vomiting, or weight loss. Her mother had colon cancer at age 60 that was resected.  Father has Crohn's disease. Prior C section.    Had triplets 14 years ago  Past Medical History:   Diagnosis Date    Hypothyroidism, unspecified     Migraine        Past Surgical History:   Procedure Laterality Date     SECTION      right thyroid lobectomy and isthmusectomy      SINUS SURGERY      TUBAL LIGATION         Review of patient's allergies indicates:  No Known Allergies  Family History    None       Tobacco Use    Smoking status: Never    Smokeless tobacco: Never   Substance and Sexual Activity    Alcohol use: Never    Drug use: Never    Sexual activity: Not on file     Review of Systems  Objective:     Vital Signs (Most Recent):  Temp: 97.7 °F (36.5 °C) (24 1055)  Pulse: 110 (24 1055)  Resp: 16 (24 1055)  BP: (!) 137/98 (24 1055)  SpO2: 99 % (24 1055) Vital Signs (24h Range):  Temp:  [97.7 °F (36.5 °C)] 97.7 °F (36.5 °C)  Pulse:  [110] 110  Resp:  [16] 16  SpO2:  [99 %] 99 %  BP: (137)/(98) 137/98     Weight: 65.8 kg (145 lb) (24 1055)  Body mass index is 28.32 kg/m².    No intake or output data in the 24 hours ending 24 1153    Lines/Drains/Airways       Peripheral Intravenous Line  Duration                  Peripheral IV - Single Lumen 24  1026 22 G Anterior;Right Hand <1 day                    Physical Exam  Vitals and nursing note reviewed. Exam conducted with a chaperone present.   Constitutional:       Appearance: Normal appearance.   Cardiovascular:      Rate and Rhythm: Normal rate.   Pulmonary:      Effort: Pulmonary effort is normal.   Abdominal:      General: Abdomen is flat.   Skin:     General: Skin is warm.   Neurological:      General: No focal deficit present.   Psychiatric:         Mood and Affect: Mood normal.         Significant Labs:  All pertinent lab results from the last 24 hours have been reviewed.    Significant Imaging:  Imaging results within the past 24 hours have been reviewed.    Assessment/Plan:     There are no hospital problems to display for this patient.      For  colonosocpy    CHRISTINA Thomas MD  Gastroenterology  [facility]

## 2024-06-18 NOTE — PROVATION PATIENT INSTRUCTIONS
Discharge Summary/Instructions after an Endoscopic Procedure  Patient Name: Adrien Bertrand  Patient MRN: [id number]  Patient YOB: 1970 Tuesday, June 18, 2024  ORLIN Thomas MD  Dear patient,  As a result of recent federal legislation (The Federal Cures Act), you may   receive lab or pathology results from your procedure in your [facility]   account before your physician is able to contact you. Your physician or   their representative will relay the results to you with their   recommendations at their soonest availability.  Thank you,  RESTRICTIONS:  During your procedure today, you received medications for sedation.  These   medications may affect your judgment, balance and coordination.  Therefore,   for 24 hours, you have the following restrictions:   - DO NOT drive a car, operate machinery, make legal/financial decisions,   sign important papers or drink alcohol.    ACTIVITY:  Today: no heavy lifting, straining or running due to procedural   sedation/anesthesia.  The following day: return to full activity including work.  DIET:  Eat and drink normally unless instructed otherwise.     TREATMENT FOR COMMON SIDE EFFECTS:  - Mild abdominal pain, nausea, belching, bloating or excessive gas:  rest,   eat lightly and use a heating pad.  - Sore Throat: treat with throat lozenges and/or gargle with warm salt   water.  - Because air was used during the procedure, expelling large amounts of air   from your rectum or belching is normal.  - If a bowel prep was taken, you may not have a bowel movement for 1-3 days.    This is normal.  SYMPTOMS TO WATCH FOR AND REPORT TO YOUR PHYSICIAN:  1. Abdominal pain or bloating, other than gas cramps.  2. Chest pain.  3. Back pain.  4. Signs of infection such as: chills or fever occurring within 24 hours   after the procedure.  5. Rectal bleeding, which would show as bright red, maroon, or black stools.   (A tablespoon of blood from the rectum is not serious, especially  if   hemorrhoids are present.)  6. Vomiting.  7. Weakness or dizziness.  GO DIRECTLY TO THE NEAREST EMERGENCY ROOM IF YOU HAVE ANY OF THE FOLLOWING:      Difficulty breathing              Chills and/or fever over 101 F   Persistent vomiting and/or vomiting blood   Severe abdominal pain   Severe chest pain   Black, tarry stools   Bleeding- more than one tablespoon   Any other symptom or condition that you feel may need urgent attention  Your doctor recommends these additional instructions:  If any biopsies were taken, your doctors clinic will contact you in 1 to 2   weeks with any results.  - Patient has a contact number available for emergencies.  The signs and   symptoms of potential delayed complications were discussed with the   patient.  Return to normal activities tomorrow.  Written discharge   instructions were provided to the patient.   - Resume previous diet.   - Continue present medications.   - Repeat colonoscopy in 5 years for surveillance.   - Discharge patient to home (via wheelchair).  For questions, problems or results please call your physician - ORLIN Thomas MD at Work:  [phone].  [facility] [place], EMERGENCY ROOM PHONE NUMBER: [phone]  IF A COMPLICATION OR EMERGENCY SITUATION ARISES AND YOU ARE UNABLE TO REACH   YOUR PHYSICIAN - GO DIRECTLY TO THE EMERGENCY ROOM.  MD ORLIN Woodson MD  6/18/2024 12:56:01 PM  This report has been verified and signed electronically.  Dear patient,  As a result of recent federal legislation (The Federal Cures Act), you may   receive lab or pathology results from your procedure in your [facility]   account before your physician is able to contact you. Your physician or   their representative will relay the results to you with their   recommendations at their soonest availability.  Thank you,  PROVATION

## 2024-06-18 NOTE — ANESTHESIA POSTPROCEDURE EVALUATION
Anesthesia Post Evaluation    Patient: Adrien Bertrand    Procedure(s) Performed: Procedure(s) (LRB):  COLON (N/A)    Final Anesthesia Type: general      Patient location during evaluation: PACU  Patient participation: Yes- Able to Participate  Level of consciousness: awake and alert and oriented  Post-procedure vital signs: reviewed and stable  Pain management: adequate  Airway patency: patent    PONV status at discharge: No PONV  Anesthetic complications: no      Cardiovascular status: hemodynamically stable  Respiratory status: unassisted  Hydration status: euvolemic  Follow-up not needed.              Vitals Value Taken Time   /84 06/18/24 1307   Temp 37 °C (98.6 °F) 06/18/24 1246   Pulse 84 06/18/24 1307   Resp 22 06/18/24 1307   SpO2 99 % 06/18/24 1307         No case tracking events are documented in the log.      Pain/Armani Score: Armani Score: 10 (6/18/2024  1:07 PM)

## 2024-06-18 NOTE — ANESTHESIA PREPROCEDURE EVALUATION
2024  Adrien Bertrand is a 53 y.o., female.    Pre-op Diagnosis: * No pre-op diagnosis entered *    Procedure(s): COLON     Review of patient's allergies indicates:  No Known Allergies    Current Outpatient Medications   Medication Instructions    albuterol (VENTOLIN HFA) 90 mcg/actuation inhaler No dose, route, or frequency recorded.    amitriptyline (ELAVIL) 25 MG tablet Nightly PRN    EScitalopram oxalate (LEXAPRO) 20 mg, Oral    fluticasone propionate (FLONASE) 50 mcg/actuation nasal spray Each Nostril    rizatriptan (MAXALT) 10 MG tablet prn    SYNTHROID 50 mcg, Oral, Daily       COLON;TX COLONOSCOPY,DIAGNOSTIC [[id number]];TX COLONOSCOPY,BIOP*    Past Medical History:   Diagnosis Date    Hypothyroidism, unspecified     Migraine        Past Surgical History:   Procedure Laterality Date     SECTION      right thyroid lobectomy and isthmusectomy      SINUS SURGERY      TUBAL LIGATION           Pre-op Assessment    I have reviewed the Patient Summary Reports.    I have reviewed the NPO Status.   I have reviewed the Medications.     Review of Systems  Anesthesia Hx:  No problems with previous Anesthesia             Denies Family Hx of Anesthesia complications.    Denies Personal Hx of Anesthesia complications.                    Social:  Non-Smoker       Cardiovascular:  Exercise tolerance: good          Denies Angina.   Denies Orthopnea.  Denies PND.    Denies MELTON.    Functional Capacity good / => 4 METS                         Musculoskeletal:  Musculoskeletal Normal                Neurological:  Denies TIA.  Denies CVA.                                    Endocrine:   Hypothyroidism          Psych:  Psychiatric Normal                    Physical Exam  General: Well nourished, Alert and Oriented    Airway:  Mallampati: III   Mouth Opening: Normal  TM Distance: Normal  Tongue: Normal  Neck ROM:  Normal ROM    Dental:  Intact    Chest/Lungs:  Clear to auscultation    Heart:  Rate: Normal  Rhythm: Regular Rhythm  No pretibial edema  No carotid bruits      Anesthesia Plan  Type of Anesthesia, risks & benefits discussed:    Anesthesia Type: Gen Natural Airway  Intra-op Monitoring Plan: Standard ASA Monitors  Post Op Pain Control Plan: IV/PO Opioids PRN  Induction:  IV  Informed Consent: Informed consent signed with the Patient and all parties understand the risks and agree with anesthesia plan.  All questions answered. Patient consented to blood products? No  ASA Score: 3  Day of Surgery Review of History & Physical: H&P Update referred to the surgeon/provider.  Anesthesia Plan Notes: GA TIVA    Ready For Surgery From Anesthesia Perspective.     .

## 2024-06-19 ENCOUNTER — PATIENT MESSAGE (OUTPATIENT)
Dept: ADMINISTRATIVE | Facility: OTHER | Age: 54
End: 2024-06-19
Payer: MEDICAID

## 2024-06-20 ENCOUNTER — PATIENT MESSAGE (OUTPATIENT)
Dept: ADMINISTRATIVE | Facility: OTHER | Age: 54
End: 2024-06-20
Payer: MEDICAID

## 2024-11-05 ENCOUNTER — LAB VISIT (OUTPATIENT)
Dept: LAB | Facility: HOSPITAL | Age: 54
End: 2024-11-05
Attending: NURSE PRACTITIONER
Payer: COMMERCIAL

## 2024-11-05 DIAGNOSIS — D47.2 MONOCLONAL GAMMOPATHY: ICD-10-CM

## 2024-11-05 LAB
ALBUMIN SERPL-MCNC: 3.5 G/DL (ref 3.5–5)
ALBUMIN/GLOB SERPL: 1 RATIO (ref 1.1–2)
ALP SERPL-CCNC: 76 UNIT/L (ref 40–150)
ALT SERPL-CCNC: 15 UNIT/L (ref 0–55)
ANION GAP SERPL CALC-SCNC: 6 MEQ/L
AST SERPL-CCNC: 17 UNIT/L (ref 5–34)
BASOPHILS # BLD AUTO: 0 X10(3)/MCL
BASOPHILS NFR BLD AUTO: 0 %
BILIRUB SERPL-MCNC: 0.2 MG/DL
BUN SERPL-MCNC: 17 MG/DL (ref 9.8–20.1)
CALCIUM SERPL-MCNC: 9.2 MG/DL (ref 8.4–10.2)
CHLORIDE SERPL-SCNC: 105 MMOL/L (ref 98–107)
CO2 SERPL-SCNC: 28 MMOL/L (ref 22–29)
CREAT SERPL-MCNC: 1.07 MG/DL (ref 0.55–1.02)
CREAT/UREA NIT SERPL: 16
EOSINOPHIL # BLD AUTO: 0.18 X10(3)/MCL (ref 0–0.9)
EOSINOPHIL NFR BLD AUTO: 3.9 %
ERYTHROCYTE [DISTWIDTH] IN BLOOD BY AUTOMATED COUNT: 12.5 % (ref 11.5–17)
GFR SERPLBLD CREATININE-BSD FMLA CKD-EPI: >60 ML/MIN/1.73/M2
GLOBULIN SER-MCNC: 3.4 GM/DL (ref 2.4–3.5)
GLUCOSE SERPL-MCNC: 63 MG/DL (ref 74–100)
HCT VFR BLD AUTO: 38.4 % (ref 37–47)
HGB BLD-MCNC: 12.6 G/DL (ref 12–16)
IGA SERPL-MCNC: 193 MG/DL (ref 65–421)
IGG SERPL-MCNC: 1009 MG/DL (ref 522–1631)
IGM SERPL-MCNC: 73 MG/DL (ref 33–293)
IMM GRANULOCYTES # BLD AUTO: 0.01 X10(3)/MCL (ref 0–0.04)
IMM GRANULOCYTES NFR BLD AUTO: 0.2 %
LYMPHOCYTES # BLD AUTO: 1.61 X10(3)/MCL (ref 0.6–4.6)
LYMPHOCYTES NFR BLD AUTO: 35.2 %
MCH RBC QN AUTO: 29.1 PG (ref 27–31)
MCHC RBC AUTO-ENTMCNC: 32.8 G/DL (ref 33–36)
MCV RBC AUTO: 88.7 FL (ref 80–94)
MONOCYTES # BLD AUTO: 0.25 X10(3)/MCL (ref 0.1–1.3)
MONOCYTES NFR BLD AUTO: 5.5 %
NEUTROPHILS # BLD AUTO: 2.52 X10(3)/MCL (ref 2.1–9.2)
NEUTROPHILS NFR BLD AUTO: 55.2 %
PLATELET # BLD AUTO: 230 X10(3)/MCL (ref 130–400)
PMV BLD AUTO: 9.1 FL (ref 7.4–10.4)
POTASSIUM SERPL-SCNC: 3.8 MMOL/L (ref 3.5–5.1)
PROT SERPL-MCNC: 6.9 GM/DL (ref 6.4–8.3)
RBC # BLD AUTO: 4.33 X10(6)/MCL (ref 4.2–5.4)
SODIUM SERPL-SCNC: 139 MMOL/L (ref 136–145)
WBC # BLD AUTO: 4.57 X10(3)/MCL (ref 4.5–11.5)

## 2024-11-05 PROCEDURE — 80053 COMPREHEN METABOLIC PANEL: CPT

## 2024-11-05 PROCEDURE — 36415 COLL VENOUS BLD VENIPUNCTURE: CPT

## 2024-11-05 PROCEDURE — 83521 IG LIGHT CHAINS FREE EACH: CPT

## 2024-11-05 PROCEDURE — 85025 COMPLETE CBC W/AUTO DIFF WBC: CPT

## 2024-11-05 PROCEDURE — 86334 IMMUNOFIX E-PHORESIS SERUM: CPT

## 2024-11-05 PROCEDURE — 82784 ASSAY IGA/IGD/IGG/IGM EACH: CPT

## 2024-11-06 LAB
ALBUMIN % SPEP (OHS): 51.65 (ref 48.1–59.5)
ALBUMIN SERPL-MCNC: 3.3 G/DL (ref 3.5–5)
ALBUMIN/GLOB SERPL: 1.1 RATIO (ref 1.1–2)
ALPHA 1 GLOB (OHS): 0.27 GM/DL (ref 0–0.4)
ALPHA 1 GLOB% (OHS): 4.28 (ref 2.3–4.9)
ALPHA 2 GLOB % (OHS): 12.3 (ref 6.9–13)
ALPHA 2 GLOB (OHS): 0.77 GM/DL (ref 0.4–1)
BETA GLOB (OHS): 0.93 GM/DL (ref 0.7–1.3)
BETA GLOB% (OHS): 14.73 (ref 13.8–19.7)
GAMMA GLOBULIN % (OHS): 17.04 (ref 10.1–21.9)
GAMMA GLOBULIN (OHS): 1.07 GM/DL (ref 0.4–1.8)
GLOBULIN SER-MCNC: 3 GM/DL (ref 2.4–3.5)
M SPIKE % (OHS): 0.44
M SPIKE (OHS): 0.03 GM/DL
PATH REV: NORMAL
PROT SERPL-MCNC: 6.3 GM/DL (ref 6.4–8.3)

## 2024-11-08 LAB
KAPPA LC FREE SER NEPH-MCNC: 1.36 MG/DL (ref 0.33–1.94)
KAPPA LC FREE/LAMBDA FREE SER NEPH: 1.1 {RATIO} (ref 0.26–1.65)
LAMBDA LC FREE SERPL-MCNC: 1.24 MG/DL (ref 0.57–2.63)

## 2024-11-12 ENCOUNTER — OFFICE VISIT (OUTPATIENT)
Dept: HEMATOLOGY/ONCOLOGY | Facility: CLINIC | Age: 54
End: 2024-11-12
Payer: COMMERCIAL

## 2024-11-12 VITALS
OXYGEN SATURATION: 100 % | HEART RATE: 81 BPM | BODY MASS INDEX: 26.51 KG/M2 | SYSTOLIC BLOOD PRESSURE: 126 MMHG | WEIGHT: 140.38 LBS | RESPIRATION RATE: 18 BRPM | HEIGHT: 61 IN | TEMPERATURE: 98 F | DIASTOLIC BLOOD PRESSURE: 84 MMHG

## 2024-11-12 DIAGNOSIS — D47.2 MONOCLONAL GAMMOPATHY: Primary | ICD-10-CM

## 2024-11-12 PROCEDURE — 1159F MED LIST DOCD IN RCRD: CPT | Mod: CPTII,S$GLB,, | Performed by: NURSE PRACTITIONER

## 2024-11-12 PROCEDURE — 3079F DIAST BP 80-89 MM HG: CPT | Mod: CPTII,S$GLB,, | Performed by: NURSE PRACTITIONER

## 2024-11-12 PROCEDURE — 99999 PR PBB SHADOW E&M-EST. PATIENT-LVL IV: CPT | Mod: PBBFAC,,, | Performed by: NURSE PRACTITIONER

## 2024-11-12 PROCEDURE — 3008F BODY MASS INDEX DOCD: CPT | Mod: CPTII,S$GLB,, | Performed by: NURSE PRACTITIONER

## 2024-11-12 PROCEDURE — 3074F SYST BP LT 130 MM HG: CPT | Mod: CPTII,S$GLB,, | Performed by: NURSE PRACTITIONER

## 2024-11-12 PROCEDURE — 99214 OFFICE O/P EST MOD 30 MIN: CPT | Mod: S$GLB,,, | Performed by: NURSE PRACTITIONER

## 2024-11-12 NOTE — PROGRESS NOTES
HEMATOLOGY/ONCOLOGY OFFICE CLINIC VISIT      Visit Information:    Initial Evaluation: 10/12/2022  Referring Provider: Dr Mancia  Other providers:  Code status:     Diagnosis:  MGUS  Leukopenia     Present treatment:     Treatment/Oncology history:     Plan of care: MM w/u      Imaging:      Pathology:    CLINICAL HISTORY:       Patient: Adrien Bertrand is a 54 y.o. female  kindly referred for monoclonal gammopathy.   Patient was being evaluated for rheumatoid arthritis when her rheumatoid factor was elevated.  Quantitative immunoglobulins were performed they will normal.  Serum protein electrophoresis showed an M spike of 0.3.  Blood also showed a white blood cell counts of 2.8.     Reviewing EMR Leukopenia-chronic and at least 2018 ranging from 3.8-4.4. On 2015 she had  WBC 4.4  Peripheral smear in 2016: Normocytic hypochromic red blood cell population with no significant anisocytosis.  Unremarkable granulocytic maturation, numerous monocytes, mature lymphocytes, rare eosinophils. No circulating blast forms.  Unremarkable platelet morphology.  She has been with mild iron deficiency off and on as well.       Patient does report fatigue and tiredness and of an of migraine headache.  Otherwise she is doing well and denies any fever, chills, sweats.  No chest pain or short of breath.  No changes in bowel habits.  She is  and have teenagers, triplets at home.      Chief Complaint: 6 month f/u      Interval History:  Patient  presents today for 6 month f/u for MGUS. She has no new complaints today. She had labs drawn on  11/5/24. Still with MGUS but with no anemia, normal kappa/lambda FLC ratio. Normal calcium level . Creatinine was 1.03. Admits to not drinking enough water. She denies fever, chills, sweats.  No chest pain or short of breath.  No changes in bowel habits. She reports chronic migraines.     Review of Systems   Respiratory:  Negative for cough and shortness of breath.    Cardiovascular:   "Negative for chest pain.   Gastrointestinal:  Negative for abdominal pain and diarrhea.   Genitourinary:  Negative for frequency.   Musculoskeletal:  Negative for back pain.   Skin:  Negative for rash.   Neurological:  Negative for headaches.   Psychiatric/Behavioral:  The patient is not nervous/anxious.          Histories:  PMH/PSH/FH/SOCIAL/ALLERGIES AND MEDS REVIEWED AND UPDATED AS APPROPRIATE       Vitals:    11/12/24 1449   BP: 126/84   BP Location: Left arm   Patient Position: Sitting   Pulse: 81   Resp: 18   Temp: 97.6 °F (36.4 °C)   TempSrc: Oral   SpO2: 100%   Weight: 63.7 kg (140 lb 6.4 oz)   Height: 5' 1" (1.549 m)        Physical Exam  Constitutional:       Appearance: Normal appearance.   HENT:      Head: Normocephalic.   Eyes:      Extraocular Movements: Extraocular movements intact.   Pulmonary:      Effort: Pulmonary effort is normal.   Musculoskeletal:      Cervical back: Neck supple.   Neurological:      Mental Status: She is alert.   Psychiatric:         Mood and Affect: Mood normal.         Behavior: Behavior normal.         Thought Content: Thought content normal.         Judgment: Judgment normal.       ECOG SCORE             Laboratory:  CBC with Differential:  Lab Results   Component Value Date    WBC 4.57 11/05/2024    RBC 4.33 11/05/2024    HGB 12.6 11/05/2024    HCT 38.4 11/05/2024    MCV 88.7 11/05/2024    MCH 29.1 11/05/2024    MCHC 32.8 (L) 11/05/2024    RDW 12.5 11/05/2024     11/05/2024    MPV 9.1 11/05/2024      Monoclonal IgG Kappa is present in the gamma region.   The m-spike = 0.12gm/dl.     CMP:  Sodium   Date Value Ref Range Status   11/05/2024 139 136 - 145 mmol/L Final     Potassium   Date Value Ref Range Status   11/05/2024 3.8 3.5 - 5.1 mmol/L Final     Chloride   Date Value Ref Range Status   11/05/2024 105 98 - 107 mmol/L Final     CO2   Date Value Ref Range Status   11/05/2024 28 22 - 29 mmol/L Final     Blood Urea Nitrogen   Date Value Ref Range Status "   11/05/2024 17.0 9.8 - 20.1 mg/dL Final     Creatinine   Date Value Ref Range Status   11/05/2024 1.07 (H) 0.55 - 1.02 mg/dL Final     Calcium   Date Value Ref Range Status   11/05/2024 9.2 8.4 - 10.2 mg/dL Final     Albumin   Date Value Ref Range Status   11/05/2024 3.5 3.5 - 5.0 g/dL Final   11/05/2024 3.3 (L) 3.5 - 5.0 g/dL Final     Bilirubin Total   Date Value Ref Range Status   11/05/2024 0.2 <=1.5 mg/dL Final     ALP   Date Value Ref Range Status   11/05/2024 76 40 - 150 unit/L Final     AST   Date Value Ref Range Status   11/05/2024 17 5 - 34 unit/L Final     ALT   Date Value Ref Range Status   11/05/2024 15 0 - 55 unit/L Final             Assessment:       1. Monoclonal gammopathy             MGUS  Leukopenia-chronic and at least 2018 ranging from 3.8-4.4. On 2015 she had  WBC 4.4      Plan:       The risk of progression to MM or other lymphoproliferative disorder is present at a constant rate throughout the remainder of a patient's life. This risk has been quantified at 1% per year. I also discussed with the patient that elevated globulin can be associated with autoimmune disorders or recent infections/inflammation.  This is a disorder of the immune system so anything that affect immune system can increase the globulin level.   Discuss blood work in details with her.  Monoclonal gammopathy with an M spike of 0.03.  Urine and skeletal survey were negative in 10/2022.  She is not anemic, calcium level normal. Creatinine was 1.07..admits to not drinking enough water.   Will continue to monitor.   RTC in 6 months with MM labs to be drawn 5-7 days prior. .      Encourage to call or message us for any questions or problems  The patient was given ample opportunity to ask questions, and to the best of my abilities, all questions answered to satisfaction; patient demonstrated understanding of what we discussed and agreeable to the plan.     ZITA Muniz

## 2025-05-17 ENCOUNTER — LAB VISIT (OUTPATIENT)
Dept: LAB | Facility: HOSPITAL | Age: 55
End: 2025-05-17
Attending: FAMILY MEDICINE
Payer: COMMERCIAL

## 2025-05-17 DIAGNOSIS — E89.0 POSTSURGICAL HYPOTHYROIDISM: ICD-10-CM

## 2025-05-17 DIAGNOSIS — D47.2 MONOCLONAL PARAPROTEINEMIA: ICD-10-CM

## 2025-05-17 DIAGNOSIS — E04.2 NONTOXIC MULTINODULAR GOITER: ICD-10-CM

## 2025-05-17 DIAGNOSIS — Z79.899 POLYPHARMACY: ICD-10-CM

## 2025-05-17 DIAGNOSIS — E78.5 HYPERLIPIDEMIA, UNSPECIFIED HYPERLIPIDEMIA TYPE: ICD-10-CM

## 2025-05-17 DIAGNOSIS — Z00.00 ROUTINE GENERAL MEDICAL EXAMINATION AT A HEALTH CARE FACILITY: Primary | ICD-10-CM

## 2025-05-17 LAB
ALBUMIN SERPL-MCNC: 3.8 G/DL (ref 3.5–5)
ALBUMIN/GLOB SERPL: 1.2 RATIO (ref 1.1–2)
ALP SERPL-CCNC: 99 UNIT/L (ref 40–150)
ALT SERPL-CCNC: 19 UNIT/L (ref 0–55)
ANION GAP SERPL CALC-SCNC: 6 MEQ/L
AST SERPL-CCNC: 20 UNIT/L (ref 11–45)
BILIRUB SERPL-MCNC: 0.4 MG/DL
BUN SERPL-MCNC: 16.5 MG/DL (ref 9.8–20.1)
CALCIUM SERPL-MCNC: 9.3 MG/DL (ref 8.4–10.2)
CHLORIDE SERPL-SCNC: 108 MMOL/L (ref 98–107)
CHOLEST SERPL-MCNC: 242 MG/DL
CHOLEST/HDLC SERPL: 3 {RATIO} (ref 0–5)
CO2 SERPL-SCNC: 27 MMOL/L (ref 22–29)
CREAT SERPL-MCNC: 0.88 MG/DL (ref 0.55–1.02)
CREAT/UREA NIT SERPL: 19
ERYTHROCYTE [DISTWIDTH] IN BLOOD BY AUTOMATED COUNT: 12.6 % (ref 11.5–17)
GFR SERPLBLD CREATININE-BSD FMLA CKD-EPI: >60 ML/MIN/1.73/M2
GLOBULIN SER-MCNC: 3.3 GM/DL (ref 2.4–3.5)
GLUCOSE SERPL-MCNC: 83 MG/DL (ref 74–100)
HCT VFR BLD AUTO: 42.8 % (ref 37–47)
HDLC SERPL-MCNC: 95 MG/DL (ref 35–60)
HGB BLD-MCNC: 13.6 G/DL (ref 12–16)
LDLC SERPL CALC-MCNC: 138 MG/DL (ref 50–140)
MCH RBC QN AUTO: 28 PG (ref 27–31)
MCHC RBC AUTO-ENTMCNC: 31.8 G/DL (ref 33–36)
MCV RBC AUTO: 88.2 FL (ref 80–94)
PLATELET # BLD AUTO: 225 X10(3)/MCL (ref 130–400)
PMV BLD AUTO: 8.9 FL (ref 7.4–10.4)
POTASSIUM SERPL-SCNC: 4.5 MMOL/L (ref 3.5–5.1)
PROT SERPL-MCNC: 7.1 GM/DL (ref 6.4–8.3)
RBC # BLD AUTO: 4.85 X10(6)/MCL (ref 4.2–5.4)
SODIUM SERPL-SCNC: 141 MMOL/L (ref 136–145)
T4 FREE SERPL-MCNC: 1.14 NG/DL (ref 0.7–1.48)
TRIGL SERPL-MCNC: 45 MG/DL (ref 37–140)
TSH SERPL-ACNC: 0.33 UIU/ML (ref 0.35–4.94)
VLDLC SERPL CALC-MCNC: 9 MG/DL
WBC # BLD AUTO: 3.86 X10(3)/MCL (ref 4.5–11.5)

## 2025-05-17 PROCEDURE — 80053 COMPREHEN METABOLIC PANEL: CPT

## 2025-05-17 PROCEDURE — 36415 COLL VENOUS BLD VENIPUNCTURE: CPT

## 2025-05-17 PROCEDURE — 84439 ASSAY OF FREE THYROXINE: CPT

## 2025-05-17 PROCEDURE — 80061 LIPID PANEL: CPT

## 2025-05-17 PROCEDURE — 85027 COMPLETE CBC AUTOMATED: CPT

## 2025-05-17 PROCEDURE — 84443 ASSAY THYROID STIM HORMONE: CPT

## 2025-05-26 ENCOUNTER — LAB VISIT (OUTPATIENT)
Dept: LAB | Facility: HOSPITAL | Age: 55
End: 2025-05-26
Attending: NURSE PRACTITIONER
Payer: COMMERCIAL

## 2025-05-26 DIAGNOSIS — D47.2 MONOCLONAL GAMMOPATHY: Primary | ICD-10-CM

## 2025-05-26 LAB
ALBUMIN SERPL-MCNC: 3.7 G/DL (ref 3.5–5)
ALBUMIN/GLOB SERPL: 1 RATIO (ref 1.1–2)
ALP SERPL-CCNC: 94 UNIT/L (ref 40–150)
ALT SERPL-CCNC: 16 UNIT/L (ref 0–55)
ANION GAP SERPL CALC-SCNC: 8 MEQ/L
AST SERPL-CCNC: 17 UNIT/L (ref 11–45)
BASOPHILS # BLD AUTO: 0.01 X10(3)/MCL
BASOPHILS NFR BLD AUTO: 0.2 %
BILIRUB SERPL-MCNC: 0.2 MG/DL
BUN SERPL-MCNC: 12.2 MG/DL (ref 9.8–20.1)
CALCIUM SERPL-MCNC: 9.2 MG/DL (ref 8.4–10.2)
CHLORIDE SERPL-SCNC: 105 MMOL/L (ref 98–107)
CO2 SERPL-SCNC: 26 MMOL/L (ref 22–29)
CREAT SERPL-MCNC: 1.1 MG/DL (ref 0.55–1.02)
CREAT/UREA NIT SERPL: 11
EOSINOPHIL # BLD AUTO: 0.13 X10(3)/MCL (ref 0–0.9)
EOSINOPHIL NFR BLD AUTO: 2.3 %
ERYTHROCYTE [DISTWIDTH] IN BLOOD BY AUTOMATED COUNT: 12.6 % (ref 11.5–17)
GFR SERPLBLD CREATININE-BSD FMLA CKD-EPI: 59 ML/MIN/1.73/M2
GLOBULIN SER-MCNC: 3.6 GM/DL (ref 2.4–3.5)
GLUCOSE SERPL-MCNC: 126 MG/DL (ref 74–100)
HCT VFR BLD AUTO: 41.9 % (ref 37–47)
HGB BLD-MCNC: 13.3 G/DL (ref 12–16)
IMM GRANULOCYTES # BLD AUTO: 0.01 X10(3)/MCL (ref 0–0.04)
IMM GRANULOCYTES NFR BLD AUTO: 0.2 %
LYMPHOCYTES # BLD AUTO: 1.67 X10(3)/MCL (ref 0.6–4.6)
LYMPHOCYTES NFR BLD AUTO: 30.1 %
MCH RBC QN AUTO: 28.2 PG (ref 27–31)
MCHC RBC AUTO-ENTMCNC: 31.7 G/DL (ref 33–36)
MCV RBC AUTO: 88.8 FL (ref 80–94)
MONOCYTES # BLD AUTO: 0.23 X10(3)/MCL (ref 0.1–1.3)
MONOCYTES NFR BLD AUTO: 4.2 %
NEUTROPHILS # BLD AUTO: 3.49 X10(3)/MCL (ref 2.1–9.2)
NEUTROPHILS NFR BLD AUTO: 63 %
PLATELET # BLD AUTO: 231 X10(3)/MCL (ref 130–400)
PMV BLD AUTO: 9.1 FL (ref 7.4–10.4)
POTASSIUM SERPL-SCNC: 4.1 MMOL/L (ref 3.5–5.1)
PROT SERPL-MCNC: 7.3 GM/DL (ref 6.4–8.3)
RBC # BLD AUTO: 4.72 X10(6)/MCL (ref 4.2–5.4)
SODIUM SERPL-SCNC: 139 MMOL/L (ref 136–145)
WBC # BLD AUTO: 5.54 X10(3)/MCL (ref 4.5–11.5)

## 2025-05-26 PROCEDURE — 83521 IG LIGHT CHAINS FREE EACH: CPT

## 2025-05-26 PROCEDURE — 84165 PROTEIN E-PHORESIS SERUM: CPT

## 2025-05-26 PROCEDURE — 85025 COMPLETE CBC W/AUTO DIFF WBC: CPT

## 2025-05-26 PROCEDURE — 36415 COLL VENOUS BLD VENIPUNCTURE: CPT

## 2025-05-26 PROCEDURE — 80053 COMPREHEN METABOLIC PANEL: CPT

## 2025-05-28 LAB
KAPPA LC FREE SER NEPH-MCNC: 1.38 MG/DL (ref 0.33–1.94)
KAPPA LC FREE/LAMBDA FREE SER NEPH: 0.84 {RATIO} (ref 0.26–1.65)
LAMBDA LC FREE SERPL-MCNC: 1.65 MG/DL (ref 0.57–2.63)
PATH REV: NORMAL

## 2025-05-29 LAB
ALBUMIN % SPEP (OHS): 52.18 (ref 48.1–59.5)
ALBUMIN SERPL-MCNC: 3.9 G/DL (ref 3.5–5)
ALBUMIN/GLOB SERPL: 1.1 RATIO (ref 1.1–2)
ALPHA 1 GLOB (OHS): 0.29 GM/DL (ref 0–0.4)
ALPHA 1 GLOB% (OHS): 3.87 (ref 2.3–4.9)
ALPHA 2 GLOB % (OHS): 11.36 (ref 6.9–13)
ALPHA 2 GLOB (OHS): 0.84 GM/DL (ref 0.4–1)
BETA GLOB (OHS): 1.14 GM/DL (ref 0.7–1.3)
BETA GLOB% (OHS): 15.45 (ref 13.8–19.7)
GAMMA GLOBULIN % (OHS): 17.13 (ref 10.1–21.9)
GAMMA GLOBULIN (OHS): 1.27 GM/DL (ref 0.4–1.8)
GLOBULIN SER-MCNC: 3.5 GM/DL (ref 2.4–3.5)
M SPIKE % (OHS): 0.5
M SPIKE (OHS): 0.04 GM/DL
PROT SERPL-MCNC: 7.4 GM/DL (ref 6.4–8.3)

## 2025-06-03 ENCOUNTER — OFFICE VISIT (OUTPATIENT)
Dept: HEMATOLOGY/ONCOLOGY | Facility: CLINIC | Age: 55
End: 2025-06-03
Payer: COMMERCIAL

## 2025-06-03 VITALS
TEMPERATURE: 98 F | SYSTOLIC BLOOD PRESSURE: 131 MMHG | OXYGEN SATURATION: 97 % | HEIGHT: 61 IN | HEART RATE: 87 BPM | DIASTOLIC BLOOD PRESSURE: 87 MMHG | RESPIRATION RATE: 18 BRPM | WEIGHT: 144.19 LBS | BODY MASS INDEX: 27.22 KG/M2

## 2025-06-03 DIAGNOSIS — R76.8 ELEVATED RHEUMATOID FACTOR: ICD-10-CM

## 2025-06-03 DIAGNOSIS — D72.819 LEUKOPENIA, UNSPECIFIED TYPE: ICD-10-CM

## 2025-06-03 DIAGNOSIS — D47.2 MONOCLONAL GAMMOPATHY: Primary | ICD-10-CM

## 2025-06-03 PROCEDURE — 3079F DIAST BP 80-89 MM HG: CPT | Mod: CPTII,S$GLB,, | Performed by: NURSE PRACTITIONER

## 2025-06-03 PROCEDURE — 1159F MED LIST DOCD IN RCRD: CPT | Mod: CPTII,S$GLB,, | Performed by: NURSE PRACTITIONER

## 2025-06-03 PROCEDURE — 3008F BODY MASS INDEX DOCD: CPT | Mod: CPTII,S$GLB,, | Performed by: NURSE PRACTITIONER

## 2025-06-03 PROCEDURE — 99214 OFFICE O/P EST MOD 30 MIN: CPT | Mod: PBBFAC | Performed by: NURSE PRACTITIONER

## 2025-06-03 PROCEDURE — 99999 PR PBB SHADOW E&M-EST. PATIENT-LVL IV: CPT | Mod: PBBFAC,,, | Performed by: NURSE PRACTITIONER

## 2025-06-03 PROCEDURE — 99214 OFFICE O/P EST MOD 30 MIN: CPT | Mod: S$GLB,,, | Performed by: NURSE PRACTITIONER

## 2025-06-03 PROCEDURE — 3075F SYST BP GE 130 - 139MM HG: CPT | Mod: CPTII,S$GLB,, | Performed by: NURSE PRACTITIONER

## 2025-06-03 RX ORDER — MELOXICAM 15 MG/1
15 TABLET ORAL
COMMUNITY
Start: 2025-06-02

## (undated) DEVICE — SOL IRRI STRL WATER 1000ML

## (undated) DEVICE — UNDERPAD PROTECT PLUS 17X24IN

## (undated) DEVICE — KIT CANIST SUCTION 1200CC

## (undated) DEVICE — BLOCK BLOX BITE DENT RIM 54FR

## (undated) DEVICE — KIT SURGICAL COLON .25 1.1OZ

## (undated) DEVICE — ADAPTER DUAL NSL LUER M-M 7FT

## (undated) DEVICE — TIP SUCTION YANKAUER

## (undated) DEVICE — COLLECTION SPECIMEN NEPTUNE